# Patient Record
Sex: FEMALE | Race: BLACK OR AFRICAN AMERICAN | NOT HISPANIC OR LATINO | Employment: FULL TIME | URBAN - METROPOLITAN AREA
[De-identification: names, ages, dates, MRNs, and addresses within clinical notes are randomized per-mention and may not be internally consistent; named-entity substitution may affect disease eponyms.]

---

## 2023-06-12 ENCOUNTER — OFFICE VISIT (OUTPATIENT)
Dept: FAMILY MEDICINE | Facility: CLINIC | Age: 38
End: 2023-06-12
Payer: MEDICAID

## 2023-06-12 VITALS
BODY MASS INDEX: 39.7 KG/M2 | DIASTOLIC BLOOD PRESSURE: 80 MMHG | TEMPERATURE: 99 F | HEIGHT: 57 IN | HEART RATE: 79 BPM | OXYGEN SATURATION: 100 % | WEIGHT: 184 LBS | SYSTOLIC BLOOD PRESSURE: 117 MMHG

## 2023-06-12 DIAGNOSIS — F32.A DEPRESSION, UNSPECIFIED DEPRESSION TYPE: ICD-10-CM

## 2023-06-12 DIAGNOSIS — F41.9 ANXIETY: ICD-10-CM

## 2023-06-12 DIAGNOSIS — R07.89 RIGHT-SIDED CHEST WALL PAIN: Primary | ICD-10-CM

## 2023-06-12 DIAGNOSIS — Z00.00 ENCOUNTER FOR WELLNESS EXAMINATION: ICD-10-CM

## 2023-06-12 DIAGNOSIS — A60.00 GENITAL HERPES SIMPLEX, UNSPECIFIED SITE: ICD-10-CM

## 2023-06-12 LAB
ALBUMIN SERPL-MCNC: 3.7 G/DL (ref 3.5–5)
ALBUMIN/GLOB SERPL: 1.1 RATIO (ref 1.1–2)
ALP SERPL-CCNC: 57 UNIT/L (ref 40–150)
ALT SERPL-CCNC: 12 UNIT/L (ref 0–55)
APPEARANCE UR: CLEAR
AST SERPL-CCNC: 13 UNIT/L (ref 5–34)
BACTERIA #/AREA URNS AUTO: ABNORMAL /HPF
BASOPHILS # BLD AUTO: 0.02 X10(3)/MCL
BASOPHILS NFR BLD AUTO: 0.4 %
BILIRUB UR QL STRIP.AUTO: NEGATIVE MG/DL
BILIRUBIN DIRECT+TOT PNL SERPL-MCNC: 0.3 MG/DL
BUN SERPL-MCNC: 10.6 MG/DL (ref 7–18.7)
CALCIUM SERPL-MCNC: 8.8 MG/DL (ref 8.4–10.2)
CHLORIDE SERPL-SCNC: 105 MMOL/L (ref 98–107)
CHOLEST SERPL-MCNC: 176 MG/DL
CHOLEST/HDLC SERPL: 4 {RATIO} (ref 0–5)
CO2 SERPL-SCNC: 28 MMOL/L (ref 22–29)
COLOR UR: YELLOW
CREAT SERPL-MCNC: 0.93 MG/DL (ref 0.55–1.02)
DEPRECATED CALCIDIOL+CALCIFEROL SERPL-MC: 17.3 NG/ML (ref 30–80)
EOSINOPHIL # BLD AUTO: 0.09 X10(3)/MCL (ref 0–0.9)
EOSINOPHIL NFR BLD AUTO: 1.6 %
ERYTHROCYTE [DISTWIDTH] IN BLOOD BY AUTOMATED COUNT: 13.3 % (ref 11.5–17)
EST. AVERAGE GLUCOSE BLD GHB EST-MCNC: 102.5 MG/DL
GFR SERPLBLD CREATININE-BSD FMLA CKD-EPI: >60 MLS/MIN/1.73/M2
GLOBULIN SER-MCNC: 3.4 GM/DL (ref 2.4–3.5)
GLUCOSE SERPL-MCNC: 91 MG/DL (ref 74–100)
GLUCOSE UR QL STRIP.AUTO: NORMAL MG/DL
HAV IGM SERPL QL IA: NONREACTIVE
HBA1C MFR BLD: 5.2 %
HBV CORE IGM SERPL QL IA: NONREACTIVE
HBV SURFACE AG SERPL QL IA: NONREACTIVE
HCT VFR BLD AUTO: 42.9 % (ref 37–47)
HCV AB SERPL QL IA: NONREACTIVE
HDLC SERPL-MCNC: 41 MG/DL (ref 35–60)
HGB BLD-MCNC: 13.7 G/DL (ref 12–16)
HIV 1+2 AB+HIV1 P24 AG SERPL QL IA: NONREACTIVE
HYALINE CASTS #/AREA URNS LPF: ABNORMAL /LPF
IMM GRANULOCYTES # BLD AUTO: 0.02 X10(3)/MCL (ref 0–0.04)
IMM GRANULOCYTES NFR BLD AUTO: 0.4 %
KETONES UR QL STRIP.AUTO: NEGATIVE MG/DL
LDLC SERPL CALC-MCNC: 118 MG/DL (ref 50–140)
LEUKOCYTE ESTERASE UR QL STRIP.AUTO: NEGATIVE UNIT/L
LYMPHOCYTES # BLD AUTO: 2.98 X10(3)/MCL (ref 0.6–4.6)
LYMPHOCYTES NFR BLD AUTO: 53 %
MCH RBC QN AUTO: 27.6 PG (ref 27–31)
MCHC RBC AUTO-ENTMCNC: 31.9 G/DL (ref 33–36)
MCV RBC AUTO: 86.3 FL (ref 80–94)
MONOCYTES # BLD AUTO: 0.34 X10(3)/MCL (ref 0.1–1.3)
MONOCYTES NFR BLD AUTO: 6 %
MUCOUS THREADS URNS QL MICRO: ABNORMAL /LPF
NEUTROPHILS # BLD AUTO: 2.17 X10(3)/MCL (ref 2.1–9.2)
NEUTROPHILS NFR BLD AUTO: 38.6 %
NITRITE UR QL STRIP.AUTO: NEGATIVE
NRBC BLD AUTO-RTO: 0 %
PH UR STRIP.AUTO: 6 [PH]
PLATELET # BLD AUTO: 237 X10(3)/MCL (ref 130–400)
PMV BLD AUTO: 10.4 FL (ref 7.4–10.4)
POTASSIUM SERPL-SCNC: 4.1 MMOL/L (ref 3.5–5.1)
PROT SERPL-MCNC: 7.1 GM/DL (ref 6.4–8.3)
PROT UR QL STRIP.AUTO: ABNORMAL MG/DL
RBC # BLD AUTO: 4.97 X10(6)/MCL (ref 4.2–5.4)
RBC #/AREA URNS AUTO: ABNORMAL /HPF
RBC UR QL AUTO: NEGATIVE UNIT/L
SODIUM SERPL-SCNC: 139 MMOL/L (ref 136–145)
SP GR UR STRIP.AUTO: 1.03
SQUAMOUS #/AREA URNS LPF: ABNORMAL /HPF
T PALLIDUM AB SER QL: NONREACTIVE
T4 FREE SERPL-MCNC: 0.85 NG/DL (ref 0.7–1.48)
TRIGL SERPL-MCNC: 87 MG/DL (ref 37–140)
TSH SERPL-ACNC: 0.77 UIU/ML (ref 0.35–4.94)
UROBILINOGEN UR STRIP-ACNC: NORMAL MG/DL
VIT B12 SERPL-MCNC: 454 PG/ML (ref 213–816)
VLDLC SERPL CALC-MCNC: 17 MG/DL
WBC # SPEC AUTO: 5.62 X10(3)/MCL (ref 4.5–11.5)
WBC #/AREA URNS AUTO: ABNORMAL /HPF

## 2023-06-12 PROCEDURE — 83036 HEMOGLOBIN GLYCOSYLATED A1C: CPT | Performed by: NURSE PRACTITIONER

## 2023-06-12 PROCEDURE — 99395 PREV VISIT EST AGE 18-39: CPT | Mod: S$PBB,,, | Performed by: NURSE PRACTITIONER

## 2023-06-12 PROCEDURE — 1160F RVW MEDS BY RX/DR IN RCRD: CPT | Mod: CPTII,,, | Performed by: NURSE PRACTITIONER

## 2023-06-12 PROCEDURE — 87389 HIV-1 AG W/HIV-1&-2 AB AG IA: CPT | Performed by: NURSE PRACTITIONER

## 2023-06-12 PROCEDURE — 36415 COLL VENOUS BLD VENIPUNCTURE: CPT | Performed by: NURSE PRACTITIONER

## 2023-06-12 PROCEDURE — 3074F SYST BP LT 130 MM HG: CPT | Mod: CPTII,,, | Performed by: NURSE PRACTITIONER

## 2023-06-12 PROCEDURE — 80061 LIPID PANEL: CPT | Performed by: NURSE PRACTITIONER

## 2023-06-12 PROCEDURE — 1160F PR REVIEW ALL MEDS BY PRESCRIBER/CLIN PHARMACIST DOCUMENTED: ICD-10-PCS | Mod: CPTII,,, | Performed by: NURSE PRACTITIONER

## 2023-06-12 PROCEDURE — 3079F DIAST BP 80-89 MM HG: CPT | Mod: CPTII,,, | Performed by: NURSE PRACTITIONER

## 2023-06-12 PROCEDURE — 1159F MED LIST DOCD IN RCRD: CPT | Mod: CPTII,,, | Performed by: NURSE PRACTITIONER

## 2023-06-12 PROCEDURE — 84443 ASSAY THYROID STIM HORMONE: CPT | Performed by: NURSE PRACTITIONER

## 2023-06-12 PROCEDURE — 3079F PR MOST RECENT DIASTOLIC BLOOD PRESSURE 80-89 MM HG: ICD-10-PCS | Mod: CPTII,,, | Performed by: NURSE PRACTITIONER

## 2023-06-12 PROCEDURE — 81001 URINALYSIS AUTO W/SCOPE: CPT | Performed by: NURSE PRACTITIONER

## 2023-06-12 PROCEDURE — 82607 VITAMIN B-12: CPT | Performed by: NURSE PRACTITIONER

## 2023-06-12 PROCEDURE — 3008F BODY MASS INDEX DOCD: CPT | Mod: CPTII,,, | Performed by: NURSE PRACTITIONER

## 2023-06-12 PROCEDURE — 99203 OFFICE O/P NEW LOW 30 MIN: CPT | Mod: PBBFAC,PN | Performed by: NURSE PRACTITIONER

## 2023-06-12 PROCEDURE — 1159F PR MEDICATION LIST DOCUMENTED IN MEDICAL RECORD: ICD-10-PCS | Mod: CPTII,,, | Performed by: NURSE PRACTITIONER

## 2023-06-12 PROCEDURE — 85025 COMPLETE CBC W/AUTO DIFF WBC: CPT | Performed by: NURSE PRACTITIONER

## 2023-06-12 PROCEDURE — 84439 ASSAY OF FREE THYROXINE: CPT | Performed by: NURSE PRACTITIONER

## 2023-06-12 PROCEDURE — 82306 VITAMIN D 25 HYDROXY: CPT | Performed by: NURSE PRACTITIONER

## 2023-06-12 PROCEDURE — 99395 PR PREVENTIVE VISIT,EST,18-39: ICD-10-PCS | Mod: S$PBB,,, | Performed by: NURSE PRACTITIONER

## 2023-06-12 PROCEDURE — 86780 TREPONEMA PALLIDUM: CPT | Performed by: NURSE PRACTITIONER

## 2023-06-12 PROCEDURE — 3008F PR BODY MASS INDEX (BMI) DOCUMENTED: ICD-10-PCS | Mod: CPTII,,, | Performed by: NURSE PRACTITIONER

## 2023-06-12 PROCEDURE — 3074F PR MOST RECENT SYSTOLIC BLOOD PRESSURE < 130 MM HG: ICD-10-PCS | Mod: CPTII,,, | Performed by: NURSE PRACTITIONER

## 2023-06-12 PROCEDURE — 80074 ACUTE HEPATITIS PANEL: CPT | Performed by: NURSE PRACTITIONER

## 2023-06-12 PROCEDURE — 80053 COMPREHEN METABOLIC PANEL: CPT | Performed by: NURSE PRACTITIONER

## 2023-06-12 RX ORDER — FLUOXETINE HYDROCHLORIDE 40 MG/1
40 CAPSULE ORAL
COMMUNITY
Start: 2023-05-17

## 2023-06-12 RX ORDER — ALPRAZOLAM 1 MG/1
1 TABLET ORAL DAILY PRN
COMMUNITY
Start: 2023-05-17

## 2023-06-12 RX ORDER — ERGOCALCIFEROL 1.25 MG/1
50000 CAPSULE ORAL
COMMUNITY
Start: 2023-04-19 | End: 2023-06-13

## 2023-06-12 RX ORDER — VALACYCLOVIR HYDROCHLORIDE 1 G/1
1000 TABLET, FILM COATED ORAL 2 TIMES DAILY
Qty: 20 TABLET | Refills: 11 | Status: SHIPPED | OUTPATIENT
Start: 2023-06-12

## 2023-06-12 NOTE — PROGRESS NOTES
"Patient Name: Fátima Brown   : 1985  MRN: [id number]     SUBJECTIVE DATA:    CHIEF COMPLAINT:  Fátima Brown  is a 37 y.o. female presenting to clinic today for Establish Care      HPI:  23:  Establish care with PCP.  Right chest wall pain intermittent and sporadic.  None today.  Not sure how long it lasts.  No n/v/diaphoresis, radiation.       ROS:  Review of Systems   Constitutional: Negative.    HENT: Negative.     Eyes: Negative.    Respiratory: Negative.     Cardiovascular:  Positive for chest pain.   Gastrointestinal: Negative.    Genitourinary: Negative.    Musculoskeletal: Negative.    Skin: Negative.    Neurological: Negative.    Endo/Heme/Allergies: Negative.    Psychiatric/Behavioral: Negative.     All other systems reviewed and are negative.    ALLERGIES:  Review of patient's allergies indicates:   Allergen Reactions    Ibuprofen Itching        HISTORY:  History reviewed. No pertinent past medical history.   History reviewed. No pertinent surgical history.  Family History   Problem Relation Age of Onset    Hypertension Mother     Hypertension Father     Diabetes Father      Social History     Tobacco Use   Smoking Status Every Day    Packs/day: 0.50    Types: Cigarettes   Smokeless Tobacco Never        OBJECTIVE DATA:  Vital signs  Vitals:    23 0840   BP: 117/80   BP Location: Right leg   Patient Position: Sitting   Pulse: 79   Temp: 98.8 °F (37.1 °C)   TempSrc: Oral   SpO2: 100%   Weight: 83.5 kg (184 lb)   Height: 4' 9" (1.448 m)        Physical Exam  Constitutional:       General: She is awake.      Appearance: Normal appearance. She is well-developed.   HENT:      Head: Normocephalic and atraumatic.      Right Ear: Hearing, tympanic membrane, ear canal and external ear normal.      Left Ear: Hearing, tympanic membrane, ear canal and external ear normal.      Nose: Nose normal.      Mouth/Throat:      Lips: Pink.      Mouth: Mucous membranes are moist.      Pharynx: Oropharynx " is clear. Uvula midline.   Eyes:      Pupils: Pupils are equal, round, and reactive to light.   Cardiovascular:      Rate and Rhythm: Normal rate and regular rhythm.      Pulses: Normal pulses.      Heart sounds: Normal heart sounds.   Pulmonary:      Effort: Pulmonary effort is normal.      Breath sounds: Normal breath sounds.   Abdominal:      General: Abdomen is flat. Bowel sounds are normal.      Palpations: Abdomen is soft.   Musculoskeletal:         General: Normal range of motion.      Cervical back: Normal range of motion and neck supple.   Skin:     General: Skin is warm and dry.      Capillary Refill: Capillary refill takes less than 2 seconds.   Neurological:      General: No focal deficit present.      Mental Status: She is alert, oriented to person, place, and time and easily aroused. Mental status is at baseline.   Psychiatric:         Mood and Affect: Mood normal.         Behavior: Behavior is cooperative.        ASSESSMENT/PLAN:  1. Right-sided chest wall pain  Assessment & Plan:  EKG in clinic today  ER precautions given.    Orders:  -     IN OFFICE EKG 12-LEAD (to [facility])    2. Anxiety  Assessment & Plan:  Continue Prozac, Xanax, Continue appt at [facility]. Last appt was on 5/30/23.   Practice deep breathing or abdominal breathing exercises when anxiety occurs.  Exercise daily. Get sunlight daily.  Avoid caffeine, alcohol and stimulants.  Practice positive phrases and repeat throughout the day, yoga, lavender scents or Chamomile tea will help anxiety.  Set healthy boundaries, avoid people and conversations that increase stress.  Reports any symptoms of suicidal or homicidal ideations immediately, if clinic is closed go to nearest emergency room.          3. Depression, unspecified depression type  Assessment & Plan:  Continue Prozac    Read positive daily meditations, avoid negative media, set healthy boundaries.  Exercise daily, keep consistent sleep pattern, eat a healthy diet.  Establish good social  support, make changes to reduce stress.  Reports any symptoms of suicidal/homicidal ideations or self harm immediately, if clinic is closed go to nearest emergency room.        4. Genital herpes simplex, unspecified site  Assessment & Plan:  Valtrex 1000mg po bid x 10 days during flare    Orders:  -     valACYclovir (VALTREX) 1000 MG tablet; Take 1 tablet (1,000 mg total) by mouth 2 (two) times daily.  Dispense: 20 tablet; Refill: 11    5. Encounter for wellness examination  -     Lipid Panel  -     CBC Auto Differential  -     Comprehensive Metabolic Panel  -     Urinalysis  -     Hemoglobin A1C  -     TSH  -     T4, Free  -     Vitamin D  -     Vitamin B12  -     Hepatitis Panel, Acute  -     HIV 1/2 Ag/Ab (4th Gen)  -     SYPHILIS ANTIBODY (WITH REFLEX RPR)          No results found for this or any previous visit (from the past 1008 hour(s)).        Follow Up:  Follow up in about 2 weeks (around 6/26/2023) for Review of labs.             This note was created with the assistance of a voice recognition software or phone dictation. There may be transcription errors as a result of using this technology however minimal. Effort has been made to assure accuracy of transcription but any obvious errors or omissions should be clarified with the author of the document

## 2023-06-12 NOTE — ASSESSMENT & PLAN NOTE
Continue Prozac, Xanax, Continue appt at [facility]. Last appt was on 5/30/23.   Practice deep breathing or abdominal breathing exercises when anxiety occurs.  Exercise daily. Get sunlight daily.  Avoid caffeine, alcohol and stimulants.  Practice positive phrases and repeat throughout the day, yoga, lavender scents or Chamomile tea will help anxiety.  Set healthy boundaries, avoid people and conversations that increase stress.  Reports any symptoms of suicidal or homicidal ideations immediately, if clinic is closed go to nearest emergency room.

## 2023-06-12 NOTE — ASSESSMENT & PLAN NOTE
Continue Prozac    Read positive daily meditations, avoid negative media, set healthy boundaries.  Exercise daily, keep consistent sleep pattern, eat a healthy diet.  Establish good social support, make changes to reduce stress.  Reports any symptoms of suicidal/homicidal ideations or self harm immediately, if clinic is closed go to nearest emergency room.

## 2023-06-13 DIAGNOSIS — E55.9 VITAMIN D DEFICIENCY: Primary | ICD-10-CM

## 2023-06-13 RX ORDER — ASPIRIN 325 MG
50000 TABLET, DELAYED RELEASE (ENTERIC COATED) ORAL
Qty: 12 CAPSULE | Refills: 0 | Status: SHIPPED | OUTPATIENT
Start: 2023-06-13 | End: 2024-01-19 | Stop reason: SDUPTHER

## 2023-06-15 LAB — PATH REV: NORMAL

## 2023-07-10 ENCOUNTER — HOSPITAL ENCOUNTER (EMERGENCY)
Facility: HOSPITAL | Age: 38
Discharge: HOME | End: 2023-07-10
Attending: EMERGENCY MEDICINE
Payer: MEDICAID

## 2023-07-10 VITALS
WEIGHT: 200 LBS | RESPIRATION RATE: 20 BRPM | HEART RATE: 78 BPM | DIASTOLIC BLOOD PRESSURE: 96 MMHG | SYSTOLIC BLOOD PRESSURE: 141 MMHG | TEMPERATURE: 99 F | BODY MASS INDEX: 43.28 KG/M2

## 2023-07-10 DIAGNOSIS — K02.9 PAIN DUE TO DENTAL CARIES: Primary | ICD-10-CM

## 2023-07-10 PROCEDURE — 99284 EMERGENCY DEPT VISIT MOD MDM: CPT

## 2023-07-10 RX ORDER — HYDROCODONE BITARTRATE AND ACETAMINOPHEN 5; 325 MG/1; MG/1
1 TABLET ORAL EVERY 6 HOURS PRN
Qty: 20 TABLET | Refills: 0 | Status: SHIPPED | OUTPATIENT
Start: 2023-07-10 | End: 2023-07-10 | Stop reason: SDUPTHER

## 2023-07-10 RX ORDER — HYDROCODONE BITARTRATE AND ACETAMINOPHEN 5; 325 MG/1; MG/1
1 TABLET ORAL EVERY 6 HOURS PRN
Qty: 20 TABLET | Refills: 0 | Status: SHIPPED | OUTPATIENT
Start: 2023-07-10 | End: 2023-07-15

## 2023-07-10 RX ORDER — CHLORHEXIDINE GLUCONATE ORAL RINSE 1.2 MG/ML
15 SOLUTION DENTAL 2 TIMES DAILY
Qty: 420 ML | Refills: 0 | Status: SHIPPED | OUTPATIENT
Start: 2023-07-10 | End: 2023-07-24

## 2023-07-10 RX ORDER — AMOXICILLIN AND CLAVULANATE POTASSIUM 875; 125 MG/1; MG/1
1 TABLET, FILM COATED ORAL 2 TIMES DAILY
Qty: 14 TABLET | Refills: 0 | Status: SHIPPED | OUTPATIENT
Start: 2023-07-10 | End: 2023-08-02

## 2023-07-10 NOTE — ED PROVIDER NOTES
Encounter Date: 7/10/2023       History     Chief Complaint   Patient presents with    Dental Pain     Dental pain x 2 days, left upper      37-year-old female presents to ED for evaluation of left upper dental pain and swelling.  Patient reports having a cracked tooth.  States she is been unable to get in to see a dentist.  Denies any trouble swallowing or shortness of breath.  Eating and drinking normally.    The history is provided by the patient. No  was used.   Review of patient's allergies indicates:   Allergen Reactions    Ibuprofen Itching     No past medical history on file.  No past surgical history on file.  Family History   Problem Relation Age of Onset    Hypertension Mother     Hypertension Father     Diabetes Father      Social History     Tobacco Use    Smoking status: Every Day     Packs/day: 0.50     Types: Cigarettes    Smokeless tobacco: Never   Substance Use Topics    Alcohol use: Yes     Comment: occasionaly    Drug use: Never     Review of Systems   Constitutional:  Negative for chills and fever.   HENT:  Positive for dental problem. Negative for congestion, ear pain, facial swelling, sore throat, trouble swallowing and voice change.    Respiratory:  Negative for cough and shortness of breath.    Cardiovascular: Negative.    Gastrointestinal: Negative.    Musculoskeletal: Negative.    Neurological:  Negative for dizziness and headaches.   All other systems reviewed and are negative.    Physical Exam     Initial Vitals   BP Pulse Resp Temp SpO2   07/10/23 1248 07/10/23 1246 07/10/23 1246 07/10/23 1246 --   (!) 141/96 78 20 98.5 °F (36.9 °C)       MAP       --                Physical Exam    Vitals reviewed.  Constitutional: She appears well-developed.   HENT:   Head: Normocephalic and atraumatic.   Right Ear: Tympanic membrane and external ear normal.   Left Ear: Tympanic membrane and external ear normal.   Mouth/Throat: Uvula is midline, oropharynx is clear and moist and  mucous membranes are normal. No trismus in the jaw. Dental caries present. No uvula swelling. No oropharyngeal exudate, posterior oropharyngeal edema or posterior oropharyngeal erythema.       Multiple dental caries varying ages of decay.   Eyes: Conjunctivae are normal. Pupils are equal, round, and reactive to light.   Neck: Neck supple.   Normal range of motion.  Cardiovascular:  Normal rate, regular rhythm and normal heart sounds.           Pulmonary/Chest: Breath sounds normal. She has no wheezes. She has no rhonchi. She has no rales.   Abdominal: Abdomen is soft. Bowel sounds are normal. There is no abdominal tenderness.   Musculoskeletal:         General: Normal range of motion.      Cervical back: Normal range of motion and neck supple.     Neurological: She is alert and oriented to person, place, and time. She has normal strength. No cranial nerve deficit or sensory deficit. GCS score is 15. GCS eye subscore is 4. GCS verbal subscore is 5. GCS motor subscore is 6.   Skin: Skin is warm and dry.   Psychiatric: She has a normal mood and affect.       ED Course   Procedures  Labs Reviewed - No data to display       Imaging Results    None          Medications - No data to display  Medical Decision Making:   Initial Assessment:   37-year-old female presents to ED for evaluation of left upper dental pain and swelling.  Patient reports having a cracked tooth.  States she is been unable to get in to see a dentist.  Denies any trouble swallowing or shortness of breath.  Eating and drinking normally.  Differential Diagnosis:   Dental caries, dental abscess  ED Management:  Patient presents to ED for evaluation of dental pain due to caries and cracked tooth.  Swelling noted to gums.  No trismus noted. No fluctuant abscess.  Will place on antibiotics and give short course of pain medication.  Discussed with patient need for follow-up with dentist.  Return ED precautions given.  Patient verbalizes understanding and  agrees with plan of care.                        Clinical Impression:   Final diagnoses:  [K02.9] Pain due to dental caries (Primary)        ED Disposition Condition    Discharge Stable          ED Prescriptions       Medication Sig Dispense Start Date End Date Auth. Provider    chlorhexidine (PERIDEX) 0.12 % solution Use as directed 15 mLs in the mouth or throat 2 (two) times daily. for 14 days 420 mL 7/10/2023 7/24/2023 MARILIA Torres    HYDROcodone-acetaminophen (NORCO) 5-325 mg per tablet  (Status: Discontinued) Take 1 tablet by mouth every 6 (six) hours as needed for Pain. 20 tablet 7/10/2023 7/10/2023 MARILIA Torres    amoxicillin-clavulanate 875-125mg (AUGMENTIN) 875-125 mg per tablet Take 1 tablet by mouth 2 (two) times daily. 14 tablet 7/10/2023 -- MARILIA Torres    HYDROcodone-acetaminophen (NORCO) 5-325 mg per tablet Take 1 tablet by mouth every 6 (six) hours as needed for Pain. 20 tablet 7/10/2023 7/15/2023 MARILIA Torres          Follow-up Information       Follow up With Specialties Details Why Contact Info    RAMEZ Farah Nurse Practitioner   [facility] [address] [phone]      Dentist   7-10 days, As needed              MARILIA Torres  07/10/23 2399

## 2023-07-10 NOTE — DISCHARGE INSTRUCTIONS
Take full course of antibiotics.  Swish and spit peridex. May use Norco for severe pain.  Do not drink or drive while taking narcotics it can be sedating.    [facility] [address] [place] [address] [phone] Medicaid/Medicare   Dr. Peter Stauffer, DDS  [address] [place] [address] [phone] Medicaid   [facility] [address] [place] [address] [phone] Medicaid   [facility] [address] [place] [address] [phone] Medicare [facility]  [address] [place] [address] [phone] Medicaid/Medicare   [facility] [address] [place] [address] [phone] Medicaid   [facility]  [address]  [place] [address] [phone] Medicaid for ages 5 and under; or for lip/tongue tie    Dr. Navneet Chaves, DDS [address] [place] [address] [phone] Medicaid-only for children ages 2 to 21   [facility] [address]  [place] [address] [phone] Medicaid   [facility] [address] [place] [address] [phone] Medicare [facility] [address] [place] [address] [phone] Medicaid   [facility] [address] [place] [address] [phone] Medicaid-Pediatric dentistry    [facility] [address] [place] [address] [phone] Medicaid/Medicare   [facility] [address] [place] [address] [phone] Medicaid/Medicare   [facility] [address]  [place] [address] [phone] Medicaid/Medicare   [facility] [address] [place] [address] [phone] Medicaid/Medicare   [place] [address] [address] [phone] Medicaid/Medicare   [facility] [address] [place] [address] [phone] Medicaid-Pediatric and adult   [facility] [address]  [place] [address] [phone] Medicaid   [facility] [address]  [place], [place] [id number] [phone] Medicare   Dr. Farrah Avilez,  [place] [address] [phone] Medicare for dentures only

## 2023-08-02 ENCOUNTER — OFFICE VISIT (OUTPATIENT)
Dept: FAMILY MEDICINE | Facility: CLINIC | Age: 38
End: 2023-08-02
Payer: MEDICAID

## 2023-08-02 VITALS
SYSTOLIC BLOOD PRESSURE: 138 MMHG | WEIGHT: 186.38 LBS | OXYGEN SATURATION: 99 % | DIASTOLIC BLOOD PRESSURE: 90 MMHG | BODY MASS INDEX: 40.21 KG/M2 | HEART RATE: 72 BPM | HEIGHT: 57 IN | TEMPERATURE: 98 F

## 2023-08-02 DIAGNOSIS — A60.00 GENITAL HERPES SIMPLEX, UNSPECIFIED SITE: Primary | ICD-10-CM

## 2023-08-02 DIAGNOSIS — R03.0 ELEVATED BLOOD PRESSURE READING: ICD-10-CM

## 2023-08-02 PROBLEM — R07.89 RIGHT-SIDED CHEST WALL PAIN: Status: RESOLVED | Noted: 2023-06-12 | Resolved: 2023-08-02

## 2023-08-02 PROCEDURE — 3075F SYST BP GE 130 - 139MM HG: CPT | Mod: CPTII,,, | Performed by: NURSE PRACTITIONER

## 2023-08-02 PROCEDURE — 99214 OFFICE O/P EST MOD 30 MIN: CPT | Mod: PBBFAC,PN | Performed by: NURSE PRACTITIONER

## 2023-08-02 PROCEDURE — 3075F PR MOST RECENT SYSTOLIC BLOOD PRESS GE 130-139MM HG: ICD-10-PCS | Mod: CPTII,,, | Performed by: NURSE PRACTITIONER

## 2023-08-02 PROCEDURE — 3044F PR MOST RECENT HEMOGLOBIN A1C LEVEL <7.0%: ICD-10-PCS | Mod: CPTII,,, | Performed by: NURSE PRACTITIONER

## 2023-08-02 PROCEDURE — 3080F PR MOST RECENT DIASTOLIC BLOOD PRESSURE >= 90 MM HG: ICD-10-PCS | Mod: CPTII,,, | Performed by: NURSE PRACTITIONER

## 2023-08-02 PROCEDURE — 1159F PR MEDICATION LIST DOCUMENTED IN MEDICAL RECORD: ICD-10-PCS | Mod: CPTII,,, | Performed by: NURSE PRACTITIONER

## 2023-08-02 PROCEDURE — 99213 PR OFFICE/OUTPT VISIT, EST, LEVL III, 20-29 MIN: ICD-10-PCS | Mod: S$PBB,,, | Performed by: NURSE PRACTITIONER

## 2023-08-02 PROCEDURE — 3080F DIAST BP >= 90 MM HG: CPT | Mod: CPTII,,, | Performed by: NURSE PRACTITIONER

## 2023-08-02 PROCEDURE — 1159F MED LIST DOCD IN RCRD: CPT | Mod: CPTII,,, | Performed by: NURSE PRACTITIONER

## 2023-08-02 PROCEDURE — 3008F PR BODY MASS INDEX (BMI) DOCUMENTED: ICD-10-PCS | Mod: CPTII,,, | Performed by: NURSE PRACTITIONER

## 2023-08-02 PROCEDURE — 1160F RVW MEDS BY RX/DR IN RCRD: CPT | Mod: CPTII,,, | Performed by: NURSE PRACTITIONER

## 2023-08-02 PROCEDURE — 99213 OFFICE O/P EST LOW 20 MIN: CPT | Mod: S$PBB,,, | Performed by: NURSE PRACTITIONER

## 2023-08-02 PROCEDURE — 3044F HG A1C LEVEL LT 7.0%: CPT | Mod: CPTII,,, | Performed by: NURSE PRACTITIONER

## 2023-08-02 PROCEDURE — 3008F BODY MASS INDEX DOCD: CPT | Mod: CPTII,,, | Performed by: NURSE PRACTITIONER

## 2023-08-02 PROCEDURE — 1160F PR REVIEW ALL MEDS BY PRESCRIBER/CLIN PHARMACIST DOCUMENTED: ICD-10-PCS | Mod: CPTII,,, | Performed by: NURSE PRACTITIONER

## 2023-08-02 RX ORDER — CLINDAMYCIN HYDROCHLORIDE 150 MG/1
CAPSULE ORAL
COMMUNITY
Start: 2023-07-11

## 2023-08-02 NOTE — ASSESSMENT & PLAN NOTE
Pt has to go get a COVID vaccine this afternoon and is nervous. She will return in 4 months and we will look at a log and see if she needs meds.   Low Sodium Diet (Dash Diet - less than 2 grams of sodium per day).  Monitor Blood Pressure daily and log. Report any consistent numbers greater than 140/90.  Smoking Cessation encouraged to aid in BP reduction.  Maintain healthy weight with goal BMI <30. Exercise 30 minutes per day 5 days per week

## 2023-08-02 NOTE — PROGRESS NOTES
Patient Name: Fátima Brown     : 1985    MRN: [id number]     Subjective:     Patient ID: Fátima Brown is a 37 y.o. female.    Chief Complaint:   Chief Complaint   Patient presents with    Follow-up     Pt  complaining of  appetite         HPI: 23:  FU for review of labs, missed a few appoitnments.  States that she has noticed increased appetite since starting her Vitamin D.  She started it in July and noticed she was hungrier than usual.  She has questions about Genital Herpes diagnosis today.      23:  Establish care with PCP.  Right chest wall pain intermittent and sporadic.  None today.  Not sure how long it lasts.  No n/v/diaphoresis, radiation.           ROS:      Review of Systems   Constitutional: Negative.    HENT: Negative.     Eyes: Negative.    Respiratory: Negative.     Cardiovascular: Negative.    Gastrointestinal: Negative.    Genitourinary: Negative.    Musculoskeletal: Negative.    Skin: Negative.    Neurological: Negative.    Endo/Heme/Allergies: Negative.    Psychiatric/Behavioral: Negative.     All other systems reviewed and are negative.       History:     Past Medical History:   Diagnosis Date    Anxiety     Right-sided chest wall pain 2023        History reviewed. No pertinent surgical history.    Family History   Problem Relation Age of Onset    Hypertension Mother     Hypertension Father     Diabetes Father         Social History     Tobacco Use    Smoking status: Every Day     Current packs/day: 0.50     Types: Cigarettes    Smokeless tobacco: Never   Substance and Sexual Activity    Alcohol use: Yes     Comment: occasionaly    Drug use: Never    Sexual activity: Yes     Partners: Male       Current Outpatient Medications   Medication Instructions    ALPRAZolam (XANAX) 1 mg, Oral, Daily PRN    cholecalciferol (vitamin D3) 50,000 Units, Oral, Every 7 days    clindamycin (CLEOCIN) 150 MG capsule SMARTSI Capsule(s) By Mouth Every 5 Hours     "FLUoxetine 40 mg, Oral    valACYclovir (VALTREX) 1,000 mg, Oral, 2 times daily        Review of patient's allergies indicates:   Allergen Reactions    Ibuprofen Itching       Objective:     Visit Vitals  BP (!) 138/90 (BP Location: Left arm, Patient Position: Sitting, BP Method: Large (Manual))   Pulse 72   Temp 98 °F (36.7 °C) (Oral)   Ht 4' 9" (1.448 m)   Wt 84.6 kg (186 lb 6.4 oz)   SpO2 99%   BMI 40.34 kg/m²       Physical Examination:     Physical Exam  Vitals and nursing note reviewed.   HENT:      Head: Normocephalic and atraumatic.   Cardiovascular:      Rate and Rhythm: Normal rate and regular rhythm.      Pulses: Normal pulses.      Heart sounds: Normal heart sounds.   Pulmonary:      Breath sounds: Normal breath sounds.   Musculoskeletal:         General: Normal range of motion.      Cervical back: Normal range of motion.   Skin:     General: Skin is warm and dry.   Neurological:      General: No focal deficit present.      Mental Status: She is alert and oriented to person, place, and time.   Psychiatric:         Mood and Affect: Mood normal.         Lab Results:     Chemistry:  Lab Results   Component Value Date     06/12/2023    K 4.1 06/12/2023    CHLORIDE 105 06/12/2023    BUN 10.6 06/12/2023    CREATININE 0.93 06/12/2023    EGFRNORACEVR >60 06/12/2023    GLUCOSE 91 06/12/2023    CALCIUM 8.8 06/12/2023    ALKPHOS 57 06/12/2023    LABPROT 7.1 06/12/2023    ALBUMIN 3.7 06/12/2023    AST 13 06/12/2023    ALT 12 06/12/2023    EFCJZKBM32XF 17.3 (L) 06/12/2023    TSH 0.767 06/12/2023    TGGSJK6VBJD 0.85 06/12/2023        Lab Results   Component Value Date    HGBA1C 5.2 06/12/2023        Hematology:  Lab Results   Component Value Date    WBC 5.62 06/12/2023    HGB 13.7 06/12/2023    HCT 42.9 06/12/2023     06/12/2023       Lipid Panel:  Lab Results   Component Value Date    CHOL 176 06/12/2023    HDL 41 06/12/2023    .00 06/12/2023    TRIG 87 06/12/2023    TOTALCHOLEST 4 06/12/2023    "     Urine:  Lab Results   Component Value Date    COLORUA Yellow 06/12/2023    APPEARANCEUA Clear 06/12/2023    SGUA 1.028 06/12/2023    PHUA 6.0 06/12/2023    PROTEINUA Trace (A) 06/12/2023    GLUCOSEUA Normal 06/12/2023    KETONESUA Negative 06/12/2023    BLOODUA Negative 06/12/2023    NITRITESUA Negative 06/12/2023    LEUKOCYTESUR Negative 06/12/2023    RBCUA 0-5 06/12/2023    WBCUA 0-5 06/12/2023    BACTERIA Occ (A) 06/12/2023    SQEPUA Moderate (A) 06/12/2023    HYALINECASTS 0-2 (A) 06/12/2023        Assessment:          ICD-10-CM ICD-9-CM   1. Genital herpes simplex, unspecified site  A60.00 054.10   2. Elevated blood pressure reading  R03.0 796.2        Plan:     1. Genital herpes simplex, unspecified site  Assessment & Plan:  We had a long discussion regarding herpes and diagnosing herpes.      Continue Valtrex.            2. Elevated blood pressure reading  Assessment & Plan:  Pt has to go get a COVID vaccine this afternoon and is nervous. She will return in 4 months and we will look at a log and see if she needs meds.   Low Sodium Diet (Dash Diet - less than 2 grams of sodium per day).  Monitor Blood Pressure daily and log. Report any consistent numbers greater than 140/90.  Smoking Cessation encouraged to aid in BP reduction.  Maintain healthy weight with goal BMI <30. Exercise 30 minutes per day 5 days per week             Follow up in about 4 months (around 12/2/2023) for elevated blood pressure reading in clinic, genital herpes.    Future Appointments   Date Time Provider Department Center   12/4/2023 12:00 PM RAMEZ Farah [facility]          RAMEZ Farah

## 2023-08-18 ENCOUNTER — TELEPHONE (OUTPATIENT)
Dept: FAMILY MEDICINE | Facility: CLINIC | Age: 38
End: 2023-08-18
Payer: MEDICAID

## 2023-08-18 NOTE — TELEPHONE ENCOUNTER
Called and talked to pt and she was asking for a record of a recent chest xray. We had no recent xray on file. She needed this for a TB test that came back suspicious. She will be going to  to get xray

## 2023-08-22 ENCOUNTER — OFFICE VISIT (OUTPATIENT)
Dept: URGENT CARE | Facility: CLINIC | Age: 38
End: 2023-08-22
Payer: MEDICAID

## 2023-08-22 ENCOUNTER — HOSPITAL ENCOUNTER (OUTPATIENT)
Dept: RADIOLOGY | Facility: HOSPITAL | Age: 38
Discharge: HOME | End: 2023-08-22
Payer: MEDICAID

## 2023-08-22 VITALS
DIASTOLIC BLOOD PRESSURE: 90 MMHG | HEART RATE: 75 BPM | WEIGHT: 181.13 LBS | BODY MASS INDEX: 39.08 KG/M2 | RESPIRATION RATE: 18 BRPM | OXYGEN SATURATION: 100 % | TEMPERATURE: 98 F | SYSTOLIC BLOOD PRESSURE: 125 MMHG | HEIGHT: 57 IN

## 2023-08-22 DIAGNOSIS — R76.11 ABNORMAL REACTION TO TUBERCULIN TEST: Primary | ICD-10-CM

## 2023-08-22 DIAGNOSIS — Z32.02 NEGATIVE PREGNANCY TEST: ICD-10-CM

## 2023-08-22 LAB
B-HCG UR QL: NEGATIVE
CTP QC/QA: YES

## 2023-08-22 PROCEDURE — 99214 OFFICE O/P EST MOD 30 MIN: CPT | Mod: PBBFAC,25

## 2023-08-22 PROCEDURE — 99203 OFFICE O/P NEW LOW 30 MIN: CPT | Mod: S$PBB,,,

## 2023-08-22 PROCEDURE — 81025 URINE PREGNANCY TEST: CPT | Mod: PBBFAC

## 2023-08-22 PROCEDURE — 99203 PR OFFICE/OUTPT VISIT, NEW, LEVL III, 30-44 MIN: ICD-10-PCS | Mod: S$PBB,,,

## 2023-08-22 PROCEDURE — 71046 X-RAY EXAM CHEST 2 VIEWS: CPT | Mod: TC

## 2023-08-22 NOTE — PROGRESS NOTES
"Subjective:      Patient ID: Fátima Brown is a 37 y.o. female.    Vitals:  height is 4' 9" (1.448 m) and weight is 82.1 kg (181 lb 1.6 oz). Her temperature is 98.2 °F (36.8 °C). Her blood pressure is 125/90 (abnormal) and her pulse is 75. Her respiration is 18 and oxygen saturation is 100%.     Chief Complaint: Requesting CXR for abnormal PPD reading.    Cc as above. No history of positive TB test in the past. Asymptomatic. 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       ROS   Objective:     Physical Exam   Constitutional: She is oriented to person, place, and time.   HENT:   Head: Normocephalic and atraumatic.   Neck: Neck supple.   Pulmonary/Chest: Effort normal. No respiratory distress.   Abdominal: Normal appearance.   Musculoskeletal: Normal range of motion.         General: Normal range of motion.   Neurological: no focal deficit. She is alert and oriented to person, place, and time.   Skin: Skin is warm and dry.   Psychiatric: Her behavior is normal. Mood normal.       Assessment:     1. Abnormal reaction to tuberculin test    2. Negative pregnancy test      Results for orders placed or performed in visit on 08/22/23   POCT urine pregnancy   Result Value Ref Range    POC Preg Test, Ur Negative Negative     Acceptable Yes        Plan:       Abnormal reaction to tuberculin test  -     XR CHEST PA AND LATERAL; Future; Expected date: 08/22/2023  -     POCT urine pregnancy    Negative pregnancy test        Follow up with PCP. If radiologist interpretation differs the clinic will notify you.             "

## 2023-10-16 ENCOUNTER — PATIENT MESSAGE (OUTPATIENT)
Dept: ADMINISTRATIVE | Facility: HOSPITAL | Age: 38
End: 2023-10-16
Payer: MEDICAID

## 2023-11-17 ENCOUNTER — TELEPHONE (OUTPATIENT)
Dept: FAMILY MEDICINE | Facility: CLINIC | Age: 38
End: 2023-11-17
Payer: MEDICAID

## 2024-01-12 ENCOUNTER — OFFICE VISIT (OUTPATIENT)
Dept: FAMILY MEDICINE | Facility: CLINIC | Age: 39
End: 2024-01-12
Payer: MEDICAID

## 2024-01-12 VITALS
HEIGHT: 57 IN | HEART RATE: 88 BPM | OXYGEN SATURATION: 99 % | DIASTOLIC BLOOD PRESSURE: 80 MMHG | SYSTOLIC BLOOD PRESSURE: 123 MMHG | TEMPERATURE: 98 F | WEIGHT: 176 LBS | BODY MASS INDEX: 37.97 KG/M2 | RESPIRATION RATE: 18 BRPM

## 2024-01-12 DIAGNOSIS — N92.0 MENORRHAGIA WITH REGULAR CYCLE: Primary | ICD-10-CM

## 2024-01-12 DIAGNOSIS — A60.00 GENITAL HERPES SIMPLEX, UNSPECIFIED SITE: ICD-10-CM

## 2024-01-12 DIAGNOSIS — L25.3 CONTACT DERMATITIS DUE TO CHEMICALS: ICD-10-CM

## 2024-01-12 DIAGNOSIS — F41.9 ANXIETY: ICD-10-CM

## 2024-01-12 DIAGNOSIS — F32.A DEPRESSION, UNSPECIFIED DEPRESSION TYPE: ICD-10-CM

## 2024-01-12 DIAGNOSIS — Z00.00 ENCOUNTER FOR WELLNESS EXAMINATION: ICD-10-CM

## 2024-01-12 PROBLEM — R03.0 ELEVATED BLOOD PRESSURE READING: Status: RESOLVED | Noted: 2023-08-02 | Resolved: 2024-01-12

## 2024-01-12 LAB
ALBUMIN SERPL-MCNC: 3.8 G/DL (ref 3.5–5)
ALBUMIN/GLOB SERPL: 1.1 RATIO (ref 1.1–2)
ALP SERPL-CCNC: 66 UNIT/L (ref 40–150)
ALT SERPL-CCNC: 15 UNIT/L (ref 0–55)
APPEARANCE UR: ABNORMAL
AST SERPL-CCNC: 17 UNIT/L (ref 5–34)
BACTERIA #/AREA URNS AUTO: ABNORMAL /HPF
BASOPHILS # BLD AUTO: 0.02 X10(3)/MCL
BASOPHILS NFR BLD AUTO: 0.3 %
BILIRUB SERPL-MCNC: 0.3 MG/DL
BILIRUB UR QL STRIP.AUTO: NEGATIVE
BUN SERPL-MCNC: 10.3 MG/DL (ref 7–18.7)
CALCIUM SERPL-MCNC: 8.9 MG/DL (ref 8.4–10.2)
CHLORIDE SERPL-SCNC: 107 MMOL/L (ref 98–107)
CHOLEST SERPL-MCNC: 176 MG/DL
CHOLEST/HDLC SERPL: 4 {RATIO} (ref 0–5)
CO2 SERPL-SCNC: 26 MMOL/L (ref 22–29)
COLOR UR AUTO: YELLOW
CREAT SERPL-MCNC: 0.84 MG/DL (ref 0.55–1.02)
DEPRECATED CALCIDIOL+CALCIFEROL SERPL-MC: 26.3 NG/ML (ref 30–80)
EOSINOPHIL # BLD AUTO: 0.09 X10(3)/MCL (ref 0–0.9)
EOSINOPHIL NFR BLD AUTO: 1.5 %
ERYTHROCYTE [DISTWIDTH] IN BLOOD BY AUTOMATED COUNT: 13.4 % (ref 11.5–17)
EST. AVERAGE GLUCOSE BLD GHB EST-MCNC: 93.9 MG/DL
GFR SERPLBLD CREATININE-BSD FMLA CKD-EPI: >60 MLS/MIN/1.73/M2
GLOBULIN SER-MCNC: 3.4 GM/DL (ref 2.4–3.5)
GLUCOSE SERPL-MCNC: 81 MG/DL (ref 74–100)
GLUCOSE UR QL STRIP.AUTO: NORMAL
HAV IGM SERPL QL IA: NONREACTIVE
HBA1C MFR BLD: 4.9 %
HBV CORE IGM SERPL QL IA: NONREACTIVE
HBV SURFACE AG SERPL QL IA: NONREACTIVE
HCT VFR BLD AUTO: 44.2 % (ref 37–47)
HCV AB SERPL QL IA: NONREACTIVE
HDLC SERPL-MCNC: 45 MG/DL (ref 35–60)
HGB BLD-MCNC: 13.8 G/DL (ref 12–16)
HIV 1+2 AB+HIV1 P24 AG SERPL QL IA: NONREACTIVE
HYALINE CASTS #/AREA URNS LPF: ABNORMAL /LPF
IMM GRANULOCYTES # BLD AUTO: 0.01 X10(3)/MCL (ref 0–0.04)
IMM GRANULOCYTES NFR BLD AUTO: 0.2 %
KETONES UR QL STRIP.AUTO: NEGATIVE
LDLC SERPL CALC-MCNC: 117 MG/DL (ref 50–140)
LEUKOCYTE ESTERASE UR QL STRIP.AUTO: 75
LYMPHOCYTES # BLD AUTO: 3.15 X10(3)/MCL (ref 0.6–4.6)
LYMPHOCYTES NFR BLD AUTO: 52.8 %
MCH RBC QN AUTO: 27.7 PG (ref 27–31)
MCHC RBC AUTO-ENTMCNC: 31.2 G/DL (ref 33–36)
MCV RBC AUTO: 88.8 FL (ref 80–94)
MONOCYTES # BLD AUTO: 0.43 X10(3)/MCL (ref 0.1–1.3)
MONOCYTES NFR BLD AUTO: 7.2 %
MUCOUS THREADS URNS QL MICRO: ABNORMAL /LPF
NEUTROPHILS # BLD AUTO: 2.27 X10(3)/MCL (ref 2.1–9.2)
NEUTROPHILS NFR BLD AUTO: 38 %
NITRITE UR QL STRIP.AUTO: NEGATIVE
NRBC BLD AUTO-RTO: 0 %
PH UR STRIP.AUTO: 6 [PH]
PLATELET # BLD AUTO: 226 X10(3)/MCL (ref 130–400)
PMV BLD AUTO: 10.8 FL (ref 7.4–10.4)
POTASSIUM SERPL-SCNC: 4.4 MMOL/L (ref 3.5–5.1)
PROT SERPL-MCNC: 7.2 GM/DL (ref 6.4–8.3)
PROT UR QL STRIP.AUTO: ABNORMAL
RBC # BLD AUTO: 4.98 X10(6)/MCL (ref 4.2–5.4)
RBC #/AREA URNS AUTO: ABNORMAL /HPF
RBC UR QL AUTO: NEGATIVE
SODIUM SERPL-SCNC: 138 MMOL/L (ref 136–145)
SP GR UR STRIP.AUTO: 1.03 (ref 1–1.03)
SQUAMOUS #/AREA URNS LPF: ABNORMAL /HPF
T PALLIDUM AB SER QL: NONREACTIVE
T4 FREE SERPL-MCNC: 0.87 NG/DL (ref 0.7–1.48)
TRIGL SERPL-MCNC: 68 MG/DL (ref 37–140)
TSH SERPL-ACNC: 0.71 UIU/ML (ref 0.35–4.94)
UROBILINOGEN UR STRIP-ACNC: NORMAL
VIT B12 SERPL-MCNC: 505 PG/ML (ref 213–816)
VLDLC SERPL CALC-MCNC: 14 MG/DL
WBC # SPEC AUTO: 5.97 X10(3)/MCL (ref 4.5–11.5)
WBC #/AREA URNS AUTO: ABNORMAL /HPF

## 2024-01-12 PROCEDURE — 87086 URINE CULTURE/COLONY COUNT: CPT | Performed by: NURSE PRACTITIONER

## 2024-01-12 PROCEDURE — 85025 COMPLETE CBC W/AUTO DIFF WBC: CPT | Performed by: NURSE PRACTITIONER

## 2024-01-12 PROCEDURE — 36415 COLL VENOUS BLD VENIPUNCTURE: CPT | Performed by: NURSE PRACTITIONER

## 2024-01-12 PROCEDURE — 87389 HIV-1 AG W/HIV-1&-2 AB AG IA: CPT | Performed by: NURSE PRACTITIONER

## 2024-01-12 PROCEDURE — 84439 ASSAY OF FREE THYROXINE: CPT | Performed by: NURSE PRACTITIONER

## 2024-01-12 PROCEDURE — 3074F SYST BP LT 130 MM HG: CPT | Mod: CPTII,,, | Performed by: NURSE PRACTITIONER

## 2024-01-12 PROCEDURE — 82306 VITAMIN D 25 HYDROXY: CPT | Performed by: NURSE PRACTITIONER

## 2024-01-12 PROCEDURE — 1160F RVW MEDS BY RX/DR IN RCRD: CPT | Mod: CPTII,,, | Performed by: NURSE PRACTITIONER

## 2024-01-12 PROCEDURE — 84443 ASSAY THYROID STIM HORMONE: CPT | Performed by: NURSE PRACTITIONER

## 2024-01-12 PROCEDURE — 80053 COMPREHEN METABOLIC PANEL: CPT | Performed by: NURSE PRACTITIONER

## 2024-01-12 PROCEDURE — 82607 VITAMIN B-12: CPT | Performed by: NURSE PRACTITIONER

## 2024-01-12 PROCEDURE — 3008F BODY MASS INDEX DOCD: CPT | Mod: CPTII,,, | Performed by: NURSE PRACTITIONER

## 2024-01-12 PROCEDURE — 81001 URINALYSIS AUTO W/SCOPE: CPT | Performed by: NURSE PRACTITIONER

## 2024-01-12 PROCEDURE — 80074 ACUTE HEPATITIS PANEL: CPT | Performed by: NURSE PRACTITIONER

## 2024-01-12 PROCEDURE — 99214 OFFICE O/P EST MOD 30 MIN: CPT | Mod: S$PBB,,, | Performed by: NURSE PRACTITIONER

## 2024-01-12 PROCEDURE — 80061 LIPID PANEL: CPT | Performed by: NURSE PRACTITIONER

## 2024-01-12 PROCEDURE — 3079F DIAST BP 80-89 MM HG: CPT | Mod: CPTII,,, | Performed by: NURSE PRACTITIONER

## 2024-01-12 PROCEDURE — 86780 TREPONEMA PALLIDUM: CPT | Performed by: NURSE PRACTITIONER

## 2024-01-12 PROCEDURE — 83036 HEMOGLOBIN GLYCOSYLATED A1C: CPT | Performed by: NURSE PRACTITIONER

## 2024-01-12 PROCEDURE — 1159F MED LIST DOCD IN RCRD: CPT | Mod: CPTII,,, | Performed by: NURSE PRACTITIONER

## 2024-01-12 PROCEDURE — 99215 OFFICE O/P EST HI 40 MIN: CPT | Mod: PBBFAC,PN | Performed by: NURSE PRACTITIONER

## 2024-01-12 RX ORDER — ZINC GLUCONATE 50 MG
50 TABLET ORAL DAILY
COMMUNITY

## 2024-01-12 RX ORDER — TRIAMCINOLONE ACETONIDE 0.25 MG/G
CREAM TOPICAL 2 TIMES DAILY
Qty: 15 G | Refills: 1 | Status: SHIPPED | OUTPATIENT
Start: 2024-01-12

## 2024-01-12 NOTE — PROGRESS NOTES
Patient Name: Fátima Brown     : 1985    MRN: [id number]     Subjective:     Patient ID: Fátima Brown is a 38 y.o. female.    Chief Complaint:   Chief Complaint   Patient presents with    Follow-up     Pt here for follow up. Pt c/o heavy menses x two the month of Dec with severe cramps.        HPI: 24: Routine FU but c/o heavy menses x 2 in December with severe cramps but did not go to ER at that time, states it was scary but didn't have time to go to ER due to work schedule, etc.  Has not happened again this month.    Has had ovarian cysts in past dx by her last GYN.  Does not currently have GYN here in [place].  Is amenable to referral.  PAP is due as well.  C/o bilateral hands with dryness and rash due to washing hands in chemicals a lot.  Asking for ointment today.  Is taking Vitamin D.  C/o continued fatigue.  States she drinks water t/out day.        23:  FU for review of labs, missed a few appointments.  States that she has noticed increased appetite since starting her Vitamin D.  She started it in July and noticed she was hungrier than usual.  She has questions about Genital Herpes diagnosis today.       23:  Establish care with PCP.  Right chest wall pain intermittent and sporadic.  None today.  Not sure how long it lasts.  No n/v/diaphoresis, radiation.           ROS:      Review of Systems   Constitutional:  Positive for malaise/fatigue.   HENT: Negative.     Eyes: Negative.    Respiratory: Negative.     Cardiovascular: Negative.    Gastrointestinal: Negative.    Genitourinary: Negative.    Musculoskeletal: Negative.    Skin:  Positive for rash.   Neurological: Negative.    Endo/Heme/Allergies: Negative.    Psychiatric/Behavioral: Negative.     All other systems reviewed and are negative.           History:     Past Medical History:   Diagnosis Date    Anxiety     Elevated blood pressure reading 2023    Right-sided chest wall pain 2023        History reviewed. No  "pertinent surgical history.    Family History   Problem Relation Age of Onset    Hypertension Mother     Hypertension Father     Diabetes Father         Social History     Tobacco Use    Smoking status: Every Day     Current packs/day: 0.50     Types: Cigarettes    Smokeless tobacco: Never   Substance and Sexual Activity    Alcohol use: Yes     Comment: occasionaly    Drug use: Never    Sexual activity: Yes     Partners: Male       Current Outpatient Medications   Medication Instructions    ALPRAZolam (XANAX) 1 mg, Oral, Daily PRN    ascorbic acid/multivit-min (VITAMIN C FIZZY DRINK ORAL) 1 Bag, Oral, Daily    cholecalciferol (vitamin D3) 50,000 Units, Oral, Every 7 days    clindamycin (CLEOCIN) 150 MG capsule SMARTSI Capsule(s) By Mouth Every 5 Hours    FLUoxetine 40 mg, Oral    triamcinolone acetonide 0.025% (KENALOG) 0.025 % cream Topical (Top), 2 times daily    valACYclovir (VALTREX) 1,000 mg, Oral, 2 times daily    zinc gluconate 50 mg, Oral, Daily        Review of patient's allergies indicates:   Allergen Reactions    Ibuprofen Itching       Objective:     Visit Vitals  /80 (BP Location: Left arm, Patient Position: Sitting, BP Method: Large (Automatic))   Pulse 88   Temp 98 °F (36.7 °C) (Oral)   Resp 18   Ht 4' 9" (1.448 m)   Wt 79.8 kg (176 lb)   LMP 2023 (Approximate)   SpO2 99%   BMI 38.09 kg/m²       Physical Examination:     Physical Exam  Vitals and nursing note reviewed.   HENT:      Head: Normocephalic and atraumatic.   Cardiovascular:      Rate and Rhythm: Normal rate and regular rhythm.      Pulses: Normal pulses.      Heart sounds: Normal heart sounds.   Pulmonary:      Breath sounds: Normal breath sounds.   Musculoskeletal:         General: Normal range of motion.      Cervical back: Normal range of motion.   Skin:     General: Skin is warm and dry.      Findings: Rash present.   Neurological:      General: No focal deficit present.      Mental Status: She is alert and oriented to " person, place, and time.   Psychiatric:         Mood and Affect: Mood normal.         Lab Results:     Chemistry:  Lab Results   Component Value Date     06/12/2023    K 4.1 06/12/2023    CHLORIDE 105 06/12/2023    BUN 10.6 06/12/2023    CREATININE 0.93 06/12/2023    EGFRNORACEVR >60 06/12/2023    GLUCOSE 91 06/12/2023    CALCIUM 8.8 06/12/2023    ALKPHOS 57 06/12/2023    LABPROT 7.1 06/12/2023    ALBUMIN 3.7 06/12/2023    AST 13 06/12/2023    ALT 12 06/12/2023    [id number] 17.3 (L) 06/12/2023    TSH 0.767 06/12/2023    IDKLJQ3DDIC 0.85 06/12/2023        Lab Results   Component Value Date    HGBA1C 5.2 06/12/2023        Hematology:  Lab Results   Component Value Date    WBC 5.62 06/12/2023    HGB 13.7 06/12/2023    HCT 42.9 06/12/2023     06/12/2023       Lipid Panel:  Lab Results   Component Value Date    CHOL 176 06/12/2023    HDL 41 06/12/2023    .00 06/12/2023    TRIG 87 06/12/2023    TOTALCHOLEST 4 06/12/2023        Urine:  Lab Results   Component Value Date    COLORUA Yellow 06/12/2023    APPEARANCEUA Clear 06/12/2023    SGUA 1.028 06/12/2023    PHUA 6.0 06/12/2023    PROTEINUA Trace (A) 06/12/2023    GLUCOSEUA Normal 06/12/2023    KETONESUA Negative 06/12/2023    BLOODUA Negative 06/12/2023    NITRITESUA Negative 06/12/2023    LEUKOCYTESUR Negative 06/12/2023    RBCUA 0-5 06/12/2023    WBCUA 0-5 06/12/2023    BACTERIA Occ (A) 06/12/2023    SQEPUA Moderate (A) 06/12/2023    HYALINECASTS 0-2 (A) 06/12/2023        Assessment:          ICD-10-CM ICD-9-CM   1. Menorrhagia with regular cycle  N92.0 626.2   2. Encounter for wellness examination  Z00.00 V70.0   3. Genital herpes simplex, unspecified site  A60.00 054.10   4. Contact dermatitis due to chemicals  L25.3 692.4   5. Depression, unspecified depression type  F32.A 311   6. Anxiety  F41.9 300.00        Plan:     1. Menorrhagia with regular cycle  Assessment & Plan:  Referral to GYN    Orders:  -     Ambulatory referral/consult to  Gynecology; Future; Expected date: 01/19/2024    2. Encounter for wellness examination  -     Lipid Panel  -     CBC Auto Differential  -     Comprehensive Metabolic Panel  -     Urinalysis  -     Hemoglobin A1C  -     TSH  -     T4, Free  -     Vitamin D  -     Vitamin B12  -     Hepatitis Panel, Acute  -     HIV 1/2 Ag/Ab (4th Gen)  -     SYPHILIS ANTIBODY (WITH REFLEX RPR)    3. Genital herpes simplex, unspecified site  Assessment & Plan:  Chronic stable problem. Continue Valtrex.        4. Contact dermatitis due to chemicals  Assessment & Plan:  Triamcinolone cream to hands bilaterally BID prescribed and sent to pharmacy  New problem to provider.   Consider referral to dermatology if no improvement  Discussed OTC ointments/lotions such as Eucerin, Natividad's hand cream, etc.     Orders:  -     triamcinolone acetonide 0.025% (KENALOG) 0.025 % cream; Apply topically 2 (two) times daily.  Dispense: 15 g; Refill: 1    5. Depression, unspecified depression type  Assessment & Plan:  Followed by psych        6. Anxiety  Assessment & Plan:  Followed by psych           Follow up in about 6 months (around 7/12/2024) for Wellness.    Future Appointments   Date Time Provider Department Center   7/12/2024  8:15 AM Barbra Jean Baptiste FNP [facility]        RAMEZ Farah

## 2024-01-12 NOTE — ASSESSMENT & PLAN NOTE
Triamcinolone cream to hands bilaterally BID prescribed and sent to pharmacy  New problem to provider.   Consider referral to dermatology if no improvement  Discussed OTC ointments/lotions such as Eucerin, Natividad's hand cream, etc.

## 2024-01-14 LAB — BACTERIA UR CULT: NORMAL

## 2024-01-19 DIAGNOSIS — E55.9 VITAMIN D DEFICIENCY: ICD-10-CM

## 2024-01-19 RX ORDER — ASPIRIN 325 MG
50000 TABLET, DELAYED RELEASE (ENTERIC COATED) ORAL
Qty: 12 CAPSULE | Refills: 0 | Status: SHIPPED | OUTPATIENT
Start: 2024-01-19

## 2024-03-27 ENCOUNTER — HOSPITAL ENCOUNTER (EMERGENCY)
Facility: HOSPITAL | Age: 39
Discharge: HOME | End: 2024-03-28
Attending: FAMILY MEDICINE
Payer: MEDICAID

## 2024-03-27 DIAGNOSIS — M94.0 COSTOCHONDRITIS, ACUTE: Primary | ICD-10-CM

## 2024-03-27 DIAGNOSIS — R07.9 CHEST PAIN: ICD-10-CM

## 2024-03-27 LAB
B-HCG UR QL: NEGATIVE
CTP QC/QA: YES

## 2024-03-27 PROCEDURE — 93005 ELECTROCARDIOGRAM TRACING: CPT

## 2024-03-27 PROCEDURE — 81025 URINE PREGNANCY TEST: CPT | Performed by: FAMILY MEDICINE

## 2024-03-27 PROCEDURE — 99285 EMERGENCY DEPT VISIT HI MDM: CPT | Mod: 25

## 2024-03-27 RX ORDER — KETOROLAC TROMETHAMINE 30 MG/ML
30 INJECTION, SOLUTION INTRAMUSCULAR; INTRAVENOUS
Status: COMPLETED | OUTPATIENT
Start: 2024-03-28 | End: 2024-03-28

## 2024-03-28 VITALS
SYSTOLIC BLOOD PRESSURE: 101 MMHG | HEIGHT: 57 IN | HEART RATE: 54 BPM | RESPIRATION RATE: 22 BRPM | DIASTOLIC BLOOD PRESSURE: 72 MMHG | BODY MASS INDEX: 37.48 KG/M2 | WEIGHT: 173.75 LBS | OXYGEN SATURATION: 99 % | TEMPERATURE: 98 F

## 2024-03-28 LAB
ALBUMIN SERPL-MCNC: 3.5 G/DL (ref 3.5–5)
ALBUMIN/GLOB SERPL: 1.1 RATIO (ref 1.1–2)
ALP SERPL-CCNC: 65 UNIT/L (ref 40–150)
ALT SERPL-CCNC: 14 UNIT/L (ref 0–55)
AST SERPL-CCNC: 13 UNIT/L (ref 5–34)
BASOPHILS # BLD AUTO: 0.03 X10(3)/MCL
BASOPHILS NFR BLD AUTO: 0.3 %
BILIRUB SERPL-MCNC: 0.2 MG/DL
BUN SERPL-MCNC: 17.7 MG/DL (ref 7–18.7)
CALCIUM SERPL-MCNC: 9 MG/DL (ref 8.4–10.2)
CHLORIDE SERPL-SCNC: 107 MMOL/L (ref 98–107)
CK MB SERPL-MCNC: 2.2 NG/ML
CK SERPL-CCNC: 156 U/L (ref 29–168)
CO2 SERPL-SCNC: 23 MMOL/L (ref 22–29)
CREAT SERPL-MCNC: 0.89 MG/DL (ref 0.55–1.02)
EOSINOPHIL # BLD AUTO: 0.16 X10(3)/MCL (ref 0–0.9)
EOSINOPHIL NFR BLD AUTO: 1.8 %
ERYTHROCYTE [DISTWIDTH] IN BLOOD BY AUTOMATED COUNT: 13 % (ref 11.5–17)
GFR SERPLBLD CREATININE-BSD FMLA CKD-EPI: >60 MLS/MIN/1.73/M2
GLOBULIN SER-MCNC: 3.2 GM/DL (ref 2.4–3.5)
GLUCOSE SERPL-MCNC: 101 MG/DL (ref 74–100)
HCT VFR BLD AUTO: 42 % (ref 37–47)
HGB BLD-MCNC: 13.6 G/DL (ref 12–16)
IMM GRANULOCYTES # BLD AUTO: 0.01 X10(3)/MCL (ref 0–0.04)
IMM GRANULOCYTES NFR BLD AUTO: 0.1 %
LYMPHOCYTES # BLD AUTO: 5.1 X10(3)/MCL (ref 0.6–4.6)
LYMPHOCYTES NFR BLD AUTO: 59 %
MCH RBC QN AUTO: 27.7 PG (ref 27–31)
MCHC RBC AUTO-ENTMCNC: 32.4 G/DL (ref 33–36)
MCV RBC AUTO: 85.5 FL (ref 80–94)
MONOCYTES # BLD AUTO: 0.58 X10(3)/MCL (ref 0.1–1.3)
MONOCYTES NFR BLD AUTO: 6.7 %
NEUTROPHILS # BLD AUTO: 2.77 X10(3)/MCL (ref 2.1–9.2)
NEUTROPHILS NFR BLD AUTO: 32.1 %
NRBC BLD AUTO-RTO: 0 %
PLATELET # BLD AUTO: 228 X10(3)/MCL (ref 130–400)
PMV BLD AUTO: 10.4 FL (ref 7.4–10.4)
POTASSIUM SERPL-SCNC: 3.4 MMOL/L (ref 3.5–5.1)
PROT SERPL-MCNC: 6.7 GM/DL (ref 6.4–8.3)
RBC # BLD AUTO: 4.91 X10(6)/MCL (ref 4.2–5.4)
SODIUM SERPL-SCNC: 138 MMOL/L (ref 136–145)
TROPONIN I SERPL-MCNC: <0.01 NG/ML (ref 0–0.04)
WBC # SPEC AUTO: 8.65 X10(3)/MCL (ref 4.5–11.5)

## 2024-03-28 PROCEDURE — 82553 CREATINE MB FRACTION: CPT | Performed by: FAMILY MEDICINE

## 2024-03-28 PROCEDURE — 85025 COMPLETE CBC W/AUTO DIFF WBC: CPT | Performed by: FAMILY MEDICINE

## 2024-03-28 PROCEDURE — 63600175 PHARM REV CODE 636 W HCPCS: Performed by: FAMILY MEDICINE

## 2024-03-28 PROCEDURE — 82550 ASSAY OF CK (CPK): CPT | Performed by: FAMILY MEDICINE

## 2024-03-28 PROCEDURE — 96372 THER/PROPH/DIAG INJ SC/IM: CPT | Performed by: FAMILY MEDICINE

## 2024-03-28 PROCEDURE — 80053 COMPREHEN METABOLIC PANEL: CPT | Performed by: FAMILY MEDICINE

## 2024-03-28 PROCEDURE — 84484 ASSAY OF TROPONIN QUANT: CPT | Performed by: FAMILY MEDICINE

## 2024-03-28 RX ORDER — NAPROXEN 500 MG/1
500 TABLET ORAL 2 TIMES DAILY PRN
Qty: 20 TABLET | Refills: 0 | Status: SHIPPED | OUTPATIENT
Start: 2024-03-28 | End: 2024-04-07

## 2024-03-28 RX ADMIN — KETOROLAC TROMETHAMINE 30 MG: 30 INJECTION, SOLUTION INTRAMUSCULAR; INTRAVENOUS at 12:03

## 2024-03-28 NOTE — ED PROVIDER NOTES
Encounter Date: 3/27/2024       History     Chief Complaint   Patient presents with    Chest Pain     Pt c/o chest pain x 2 days, worse with a deep breath, pt reports she can't describe pain. Vss      Patient was a 38-year-old female presents emergency room complaints of anterior chest pain.  Patient reports that she thought it may have been related to her anxiety, so when she got off work today, took Xanax at home, but reports that her chest pain still persists.  Reports with specific movements makes the pain worse along with taking deep breaths.  Denies abdominal pain nausea or vomiting.  Denies shortness of breath.  Denies recent upper respiratory infection.  Reports history of coronary artery disease in the family, so patient decided come the emergency room for evaluation.    The history is provided by the patient.     Review of patient's allergies indicates:   Allergen Reactions    Ibuprofen Itching     Past Medical History:   Diagnosis Date    Anxiety     Elevated blood pressure reading 08/02/2023    Right-sided chest wall pain 06/12/2023     History reviewed. No pertinent surgical history.  Family History   Problem Relation Age of Onset    Hypertension Mother     Hypertension Father     Diabetes Father      Social History     Tobacco Use    Smoking status: Every Day     Current packs/day: 0.50     Average packs/day: 0.5 packs/day for 20.2 years (10.1 ttl pk-yrs)     Types: Cigarettes     Start date: 2004    Smokeless tobacco: Never   Substance Use Topics    Alcohol use: Yes     Comment: occasionaly    Drug use: Never     Review of Systems   Constitutional:  Negative for chills, fatigue and fever.   HENT:  Negative for ear pain, rhinorrhea and sore throat.    Eyes:  Negative for photophobia and pain.   Respiratory:  Negative for cough, shortness of breath and wheezing.    Cardiovascular:  Positive for chest pain.   Gastrointestinal:  Negative for abdominal pain, diarrhea, nausea and vomiting.   Genitourinary:   Negative for dysuria.   Neurological:  Negative for dizziness, weakness and headaches.   All other systems reviewed and are negative.      Physical Exam     Initial Vitals [03/27/24 2330]   BP Pulse Resp Temp SpO2   127/80 63 20 98.1 °F (36.7 °C) 100 %      MAP       --         Physical Exam    Nursing note and vitals reviewed.  Constitutional: She appears well-developed and well-nourished. No distress.   HENT:   Head: Normocephalic and atraumatic.   Mouth/Throat: Oropharynx is clear and moist.   Eyes: Conjunctivae and EOM are normal. Pupils are equal, round, and reactive to light.   Neck: Neck supple.   Normal range of motion.  Cardiovascular:  Normal rate, regular rhythm, normal heart sounds and intact distal pulses.     Exam reveals no friction rub.       No murmur heard.  Pulmonary/Chest: No respiratory distress. She has no wheezes. She has no rhonchi. She has no rales. She exhibits tenderness.   Abdominal: Abdomen is soft. Bowel sounds are normal. She exhibits no distension. There is no abdominal tenderness. There is no rebound and no guarding.   Musculoskeletal:         General: Normal range of motion.      Cervical back: Normal range of motion and neck supple.     Neurological: She is alert and oriented to person, place, and time.   Skin: Skin is warm and dry. Capillary refill takes less than 2 seconds. No erythema.   Psychiatric: She has a normal mood and affect. Her behavior is normal. Judgment and thought content normal.         ED Course   Procedures  Labs Reviewed   COMPREHENSIVE METABOLIC PANEL - Abnormal; Notable for the following components:       Result Value    Potassium Level 3.4 (*)     Glucose Level 101 (*)     All other components within normal limits   CBC WITH DIFFERENTIAL - Abnormal; Notable for the following components:    MCHC 32.4 (*)     Lymph # 5.10 (*)     All other components within normal limits   TROPONIN I - Normal   CK-MB - Normal   CK - Normal   CBC W/ AUTO DIFFERENTIAL     "Narrative:     The following orders were created for panel order CBC Auto Differential.  Procedure                               Abnormality         Status                     ---------                               -----------         ------                     CBC with Differential[[id number]]        Abnormal            Final result                 Please view results for these tests on the individual orders.   POCT URINE PREGNANCY     EKG Readings: (Independently Interpreted)   My Independent EKG Interpretation  03/27/2024 11:47 PM  Rate: 60 bpm  Rhythm: Sinus  Axis: Normal  Intervals: Normal  ST Changes: None  Impression: Normal sinus rhythm, normal EKG.           Imaging Results              X-Ray Chest 1 View (Preliminary result)  Result time 03/28/24 01:04:15      Wet Read by Prabhjot Guerrero MD (03/28/24 01:04:15, [facility], Emergency Medicine)    No acute abnormality appreciated.                                     Medications   ketorolac injection 30 mg (30 mg Intramuscular Given 3/28/24 0007)     Medical Decision Making  Patient is a 38-year-old female presents emergency room complaints of anterior chest pain.  Patient was reproducible tenderness to palpation mainly on the right costosternal border.  No crepitus.  Physical exam is normal otherwise.  Findings likely related to costochondritis.  Discussed with the patient, and will obtain EKG as well as cardiac workup while patient was here in the emergency room due to family history.  Patient currently saturating 100% on room air, pulse rate 63.  Patient has listed an ibuprofen allergy.  She reports that she only has an allergy to the "stronger strength" ibuprofen, but can use ibuprofen over-the-counter.  Patient also reports that she also tolerates Aleve, and often uses aspirin over-the-counter.  Discussed with the patient, and will give a dose of Toradol here to help with the pain while obtain laboratory " evaluation.    Differential diagnosis: Atypical chest pain, costochondritis    Amount and/or Complexity of Data Reviewed  Labs: ordered.  Radiology: ordered and independent interpretation performed.    Risk  Prescription drug management.               ED Course as of 03/28/24 0105   Thu Mar 28, 2024   0105 No acute lab abnormality or xray abnormality appreciated.  Discussed results with the patient.  Stable for discharge to home.  ER precautions given for any acute worsening. [MW]      ED Course User Index  [MW] Prabhjot Guerrero MD                           Clinical Impression:  Final diagnoses:  [R07.9] Chest pain  [M94.0] Costochondritis, acute (Primary)          ED Disposition Condition    Discharge Stable          ED Prescriptions       Medication Sig Dispense Start Date End Date Auth. Provider    naproxen (NAPROSYN) 500 MG tablet Take 1 tablet (500 mg total) by mouth 2 (two) times daily as needed (pain). 20 tablet 3/28/2024 4/7/2024 Prabhjot Guerrero MD          Follow-up Information       Follow up With Specialties Details Why Contact Info    Barbra Jean Baptiste, FNP Nurse Practitioner   [address] [place] [id number] [phone]      [facility] Emergency Medicine  As needed, If symptoms worsen [address] [facility] [address] [phone]             Prabhjot Guerrero MD  03/28/24 0105

## 2024-04-02 LAB
OHS QRS DURATION: 86 MS
OHS QTC CALCULATION: 446 MS

## 2024-05-02 DIAGNOSIS — L25.3 CONTACT DERMATITIS DUE TO CHEMICALS: ICD-10-CM

## 2024-05-03 RX ORDER — TRIAMCINOLONE ACETONIDE 0.25 MG/G
1 CREAM TOPICAL 2 TIMES DAILY
Qty: 15 G | Refills: 1 | Status: SHIPPED | OUTPATIENT
Start: 2024-05-03

## 2024-06-27 DIAGNOSIS — E55.9 VITAMIN D DEFICIENCY: ICD-10-CM

## 2024-06-27 RX ORDER — ASPIRIN 325 MG
50000 TABLET, DELAYED RELEASE (ENTERIC COATED) ORAL
Qty: 12 CAPSULE | Refills: 0 | Status: SHIPPED | OUTPATIENT
Start: 2024-06-27

## 2024-07-12 ENCOUNTER — OFFICE VISIT (OUTPATIENT)
Dept: FAMILY MEDICINE | Facility: CLINIC | Age: 39
End: 2024-07-12
Payer: MEDICAID

## 2024-07-12 VITALS
RESPIRATION RATE: 18 BRPM | TEMPERATURE: 98 F | DIASTOLIC BLOOD PRESSURE: 85 MMHG | SYSTOLIC BLOOD PRESSURE: 135 MMHG | OXYGEN SATURATION: 98 % | HEART RATE: 65 BPM | HEIGHT: 57 IN | WEIGHT: 176 LBS | BODY MASS INDEX: 37.97 KG/M2

## 2024-07-12 DIAGNOSIS — A60.00 GENITAL HERPES SIMPLEX, UNSPECIFIED SITE: ICD-10-CM

## 2024-07-12 DIAGNOSIS — H66.92 LEFT OTITIS MEDIA, UNSPECIFIED OTITIS MEDIA TYPE: Primary | ICD-10-CM

## 2024-07-12 DIAGNOSIS — L98.9 SKIN LESION OF FACE: ICD-10-CM

## 2024-07-12 DIAGNOSIS — H66.90 ACUTE OTITIS MEDIA, UNSPECIFIED OTITIS MEDIA TYPE: ICD-10-CM

## 2024-07-12 PROBLEM — L25.3 CONTACT DERMATITIS DUE TO CHEMICALS: Status: RESOLVED | Noted: 2024-01-12 | Resolved: 2024-07-12

## 2024-07-12 PROCEDURE — 99214 OFFICE O/P EST MOD 30 MIN: CPT | Mod: PBBFAC,PN | Performed by: NURSE PRACTITIONER

## 2024-07-12 RX ORDER — AMOXICILLIN 875 MG/1
875 TABLET, FILM COATED ORAL EVERY 12 HOURS
Qty: 14 TABLET | Refills: 0 | Status: SHIPPED | OUTPATIENT
Start: 2024-07-12 | End: 2024-07-19

## 2024-07-12 NOTE — PROGRESS NOTES
Patient Name: Fátima Brown     : 1985    MRN: [id number]     Subjective:     Patient ID: Fátima Brown is a 38 y.o. female.    Chief Complaint:   Chief Complaint   Patient presents with    Follow-up     Pt is here for follow up. Pt c/o fluid to ears x two months. Pt also c/o bilateral ankle pain every AM with multiple skin lesions        HPI: 24:  Pt is here for follow up. Pt c/o fluid to ears x two months. Pt also c/o bilateral ankle pain every AM with multiple skin lesions.      Bilateral ankle pain-  Pt states that when she wakes and dresses for work both of her ankles hurt. She has never injured them.  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  Her shoes are not very supportive and she does not wear compression socks.      Bilateral ear pain-  For a couple of days the left ear has been hurting worse than the right.  She denies any history of allergies.      Lesions to skin of face-  Recently started.  He plucks hairs from her chin and this causes open lesions. No skin care routine reported.        Patient has GYN appointment upcoming. Due for PAP. Having problems with heavy menses and occasionally has pain to left lower abdomen. Hx ovarian cysts in past.           ROS:      Review of Systems   Gastrointestinal:  Positive for abdominal pain.   Genitourinary: Negative.    Skin:  Positive for rash.   All other systems reviewed and are negative.           History:     Past Medical History:   Diagnosis Date    Anxiety     Contact dermatitis due to chemicals 2024    Elevated blood pressure reading 2023    Right-sided chest wall pain 2023        History reviewed. No pertinent surgical history.    Family History   Problem Relation Name Age of Onset    Hypertension Mother      Hypertension Father      Diabetes Father          Social History     Tobacco Use    Smoking status: Every Day     Current packs/day: 0.50     Average  "packs/day: 0.5 packs/day for 20.5 years (10.3 ttl pk-yrs)     Types: Cigarettes     Start date: 2004    Smokeless tobacco: Never   Substance and Sexual Activity    Alcohol use: Yes     Comment: occasionaly    Drug use: Never    Sexual activity: Yes     Partners: Male       Current Outpatient Medications   Medication Instructions    ALPRAZolam (XANAX) 1 mg, Oral, Daily PRN    amoxicillin (AMOXIL) 875 mg, Oral, Every 12 hours    ascorbic acid/multivit-min (VITAMIN C FIZZY DRINK ORAL) 1 Bag, Oral, Daily    cholecalciferol (vitamin D3) 50,000 Units, Oral, Every 7 days    FLUoxetine 40 mg, Oral    triamcinolone acetonide 0.025% (KENALOG) 0.025 % cream 1 application , 2 times daily, Apply to affected area    valACYclovir (VALTREX) 1,000 mg, Oral, 2 times daily        Review of patient's allergies indicates:   Allergen Reactions    Ibuprofen Itching       Objective:     Visit Vitals  /85 (BP Location: Left arm, Patient Position: Sitting, BP Method: Large (Automatic))   Pulse 65   Temp 97.9 °F (36.6 °C) (Oral)   Resp 18   Ht 4' 9" (1.448 m)   Wt 79.8 kg (176 lb)   LMP 07/08/2024 (Approximate)   SpO2 98%   BMI 38.09 kg/m²       Physical Examination:     Physical Exam  Vitals reviewed.   Constitutional:       General: She is awake.      Appearance: Normal appearance. She is well-developed and well-groomed.   HENT:      Head: Normocephalic and atraumatic.      Right Ear: Hearing, tympanic membrane, ear canal and external ear normal.      Left Ear: Hearing normal. A middle ear effusion is present. There is no impacted cerumen. No foreign body. Tympanic membrane is not injected, scarred, perforated, erythematous, retracted or bulging.      Nose: Nose normal.      Mouth/Throat:      Lips: Pink.      Mouth: Mucous membranes are moist.   Cardiovascular:      Rate and Rhythm: Normal rate and regular rhythm.      Pulses: Normal pulses.      Heart sounds: Normal heart sounds, S1 normal and S2 normal.   Pulmonary:      Effort: " Pulmonary effort is normal.      Breath sounds: Normal breath sounds. No decreased breath sounds, wheezing, rhonchi or rales.   Musculoskeletal:      Cervical back: Full passive range of motion without pain and normal range of motion.      Right lower leg: No edema.      Left lower leg: No edema.   Skin:     General: Skin is warm and dry.   Neurological:      General: No focal deficit present.      Mental Status: She is alert and oriented to person, place, and time.      GCS: GCS eye subscore is 4. GCS verbal subscore is 5. GCS motor subscore is 6.      Cranial Nerves: Cranial nerves 2-12 are intact.      Sensory: Sensation is intact.      Motor: Motor function is intact.      Coordination: Coordination is intact.   Psychiatric:         Attention and Perception: Attention and perception normal.         Mood and Affect: Mood and affect normal.         Speech: Speech normal.         Behavior: Behavior is cooperative.         Thought Content: Thought content normal.         Cognition and Memory: Cognition and memory normal.         Lab Results:     Chemistry:  Lab Results   Component Value Date     03/28/2024    K 3.4 (L) 03/28/2024    BUN 17.7 03/28/2024    CREATININE 0.89 03/28/2024    EGFRNORACEVR >60 03/28/2024    GLUCOSE 101 (H) 03/28/2024    CALCIUM 9.0 03/28/2024    ALKPHOS 65 03/28/2024    LABPROT 6.7 03/28/2024    ALBUMIN 3.5 03/28/2024    AST 13 03/28/2024    ALT 14 03/28/2024    [id number] 26.3 (L) 01/12/2024    TSH 0.709 01/12/2024    [id number] 0.87 01/12/2024        Lab Results   Component Value Date    HGBA1C 4.9 01/12/2024        Hematology:  Lab Results   Component Value Date    WBC 8.65 03/28/2024    HGB 13.6 03/28/2024    HCT 42.0 03/28/2024     03/28/2024       Lipid Panel:  Lab Results   Component Value Date    CHOL 176 01/12/2024    HDL 45 01/12/2024    .00 01/12/2024    TRIG 68 01/12/2024    TOTALCHOLEST 4 01/12/2024        Urine:  Lab Results   Component Value Date     APPEARANCEUA Turbid (A) 01/12/2024    SGUA 1.030 01/12/2024    PROTEINUA 1+ (A) 01/12/2024    KETONESUA Negative 01/12/2024    LEUKOCYTESUR 75 (A) 01/12/2024    RBCUA 0-5 01/12/2024    WBCUA 11-20 (A) 01/12/2024    BACTERIA Occ (A) 01/12/2024    SQEPUA Many (A) 01/12/2024    HYALINECASTS 0-2 (A) 01/12/2024        Assessment:          ICD-10-CM ICD-9-CM   1. Left otitis media, unspecified otitis media type  H66.92 382.9   2. Skin lesion of face  L98.9 709.9   3. Acute otitis media, unspecified otitis media type  H66.90 382.9   4. Genital herpes simplex, unspecified site  A60.00 054.10        Plan:     1. Left otitis media, unspecified otitis media type  -     amoxicillin (AMOXIL) 875 MG tablet; Take 1 tablet (875 mg total) by mouth every 12 (twelve) hours. for 7 days  Dispense: 14 tablet; Refill: 0    2. Skin lesion of face  Assessment & Plan:  Discussed skin care routine today.  Advised benzoyl peroxide washes daily with moisturizer.   If worsens, return to clinic for referral to dermatology.        3. Acute otitis media, unspecified otitis media type  Assessment & Plan:  Amoxil 875 mg po BID x 7 days      4. Genital herpes simplex, unspecified site  Assessment & Plan:  Chronic stable problem. Continue Valtrex.    Pt states she has never had a lesion to her vagina.  She is not even really sure if she has genital herpes.  She is on suppressive therapy prior to seeing me.            Follow up in about 6 months (around 1/12/2025) for Wellness.    Future Appointments   Date Time Provider Department Center   9/30/2024  9:00 AM [facility]    1/13/2025  8:30 AM Barbra Jean Baptiste FNP [facility]        RAMEZ Farah

## 2024-07-12 NOTE — ASSESSMENT & PLAN NOTE
Chronic stable problem. Continue Valtrex.    Pt states she has never had a lesion to her vagina.  She is not even really sure if she has genital herpes.  She is on suppressive therapy prior to seeing me.

## 2024-07-12 NOTE — ASSESSMENT & PLAN NOTE
Discussed skin care routine today.  Advised benzoyl peroxide washes daily with moisturizer.   If worsens, return to clinic for referral to dermatology.

## 2024-08-26 ENCOUNTER — OFFICE VISIT (OUTPATIENT)
Dept: FAMILY MEDICINE | Facility: CLINIC | Age: 39
End: 2024-08-26
Payer: MEDICAID

## 2024-08-26 DIAGNOSIS — U07.1 COVID: Primary | ICD-10-CM

## 2024-08-26 PROCEDURE — 99499 UNLISTED E&M SERVICE: CPT | Mod: S$PBB,,, | Performed by: NURSE PRACTITIONER

## 2024-08-26 PROCEDURE — 99211 OFF/OP EST MAY X REQ PHY/QHP: CPT | Mod: PBBFAC,PN | Performed by: NURSE PRACTITIONER

## 2024-08-26 NOTE — PROGRESS NOTES
Pt tested Positive for COVID yesterday 8/25/24.  She is here for an appointment she scheduled for cough and sore throat.

## 2024-09-30 ENCOUNTER — OFFICE VISIT (OUTPATIENT)
Dept: GYNECOLOGY | Facility: CLINIC | Age: 39
End: 2024-09-30
Payer: MEDICAID

## 2024-09-30 VITALS
OXYGEN SATURATION: 100 % | DIASTOLIC BLOOD PRESSURE: 76 MMHG | WEIGHT: 178.38 LBS | TEMPERATURE: 98 F | BODY MASS INDEX: 38.48 KG/M2 | HEART RATE: 62 BPM | SYSTOLIC BLOOD PRESSURE: 116 MMHG | HEIGHT: 57 IN

## 2024-09-30 DIAGNOSIS — N92.0 MENORRHAGIA WITH REGULAR CYCLE: ICD-10-CM

## 2024-09-30 DIAGNOSIS — Z12.4 SCREENING FOR CERVICAL CANCER: Primary | ICD-10-CM

## 2024-09-30 PROCEDURE — 99213 OFFICE O/P EST LOW 20 MIN: CPT | Mod: PBBFAC

## 2024-09-30 PROCEDURE — 88174 CYTOPATH C/V AUTO IN FLUID: CPT

## 2024-09-30 PROCEDURE — 87624 HPV HI-RISK TYP POOLED RSLT: CPT

## 2024-09-30 RX ORDER — BUSPIRONE HYDROCHLORIDE 5 MG/1
5 TABLET ORAL 2 TIMES DAILY
COMMUNITY

## 2024-09-30 NOTE — PROGRESS NOTES
[facility] GYNECOLOGY CLINIC NOTE     Fátima Brown is a 38 y.o.  presenting to GYN clinic for AUB.    Patient with very short history of AUB. She reports that around 2023 she had 2 months of heavy menses (she was using 5 pads per day during that time). She reports cramping during this time as well when she was bleeding. Menses still lasted the normal 3-5 days. She has now returned to having normal monthly menses. No longer heavy, using 2-3 pads per day not soaked. She was under more stress at work when her menses were heavier.     Gyn Hx:   Triad: 11/R/3-5  Contraception: Not using anything, is sexually active with one male partner. Not actively trying to get pregnant   STI: Patient reports she was told she has HSV diagnosed by her physician via bloodwork and was prescribed Valtrex, but has never had a sore orally or genitally.   Pap: Denies hx of abnormal paps.     Gynecology  OB History          0    Para   0    Term   0       0    AB   0    Living   0         SAB   0    IAB   0    Ectopic   0    Multiple   0    Live Births   0                Past Medical History:   Diagnosis Date    Anxiety     Contact dermatitis due to chemicals 2024    Elevated blood pressure reading 2023    Right-sided chest wall pain 2023      Past Surgical History:   Procedure Laterality Date    HYSTEROSCOPY WITH DILATION AND CURETTAGE OF UTERUS N/A     Patient does not recall indication or pathology results        Current Outpatient Medications   Medication Instructions    ALPRAZolam (XANAX) 1 mg, Daily PRN    ascorbic acid/multivit-min (VITAMIN C FIZZY DRINK ORAL) 1 Bag, Daily    busPIRone (BUSPAR) 5 mg, 2 times daily    cholecalciferol (vitamin D3) 50,000 Units, Oral, Every 7 days    FLUoxetine 40 mg    triamcinolone acetonide 0.025% (KENALOG) 0.025 % cream 1 application , 2 times daily, Apply to affected area    valACYclovir (VALTREX) 1,000 mg, Oral, 2 times daily     Social History  "    Tobacco Use    Smoking status: Every Day     Current packs/day: 0.50     Average packs/day: 0.5 packs/day for 20.7 years (10.4 ttl pk-yrs)     Types: Cigarettes     Start date:     Smokeless tobacco: Never   Substance Use Topics    Alcohol use: Yes     Comment: occasionaly    Drug use: Never       Review of Systems  Pertinent items are noted in HPI.     Objective:     /76 (BP Location: Right arm)   Pulse 62   Temp 98 °F (36.7 °C)   Ht 4' 9" (1.448 m)   Wt 80.9 kg (178 lb 6.4 oz)   LMP 2024   SpO2 100%   BMI 38.61 kg/m²     Physical Exam:  Gen: Well-nourished, well-developed female appearing stated age. Alert, cooperative, in no acute distress.  CV: regular rate  Chest: no increased work of breathing  Abdomen: Soft, non-tender, no masses.  Extrem: Extremities normal, atraumatic, non-tender calves.  External genitalia: Normal female genetalia without lesion, discharge or tenderness.  Speculum Exam: Vaginal vault without discharge, nonodorous, no lesions/masses seen.  Cervical os visualized as closed, no lesions/masses.   Bimanual Exam: No cervical motion tenderness.  Uterus freely mobile.  Normal size uterus. No adnexal masses.  Nontender exam.     Note: Female chaperone present for entirety of exam.    Assessment:       38 y.o.  here for referral for AUB x2 months, resolved.  1. Screening for cervical cancer  Liquid-Based Pap Smear, Screening      2. Menorrhagia with regular cycle  Ambulatory referral/consult to Gynecology        Plan:     Problem List Items Addressed This Visit          Renal/    Menorrhagia with regular cycle     Patient with resolved AUB reported as heavier than normal menses with dysmenorrhea x2 months.   No further complaints or concerns.          Other Visit Diagnoses       Screening for cervical cancer    -  Primary    Relevant Orders    Liquid-Based Pap Smear, Screening          Return to clinic for annual in 1 year with NP.    Discussed patient and plan " with Dr. Mccoy.    Liliane Buckner, DO  [facility] PGY-3

## 2024-10-01 NOTE — ASSESSMENT & PLAN NOTE
Patient with resolved AUB reported as heavier than normal menses with dysmenorrhea x2 months.   No further complaints or concerns.

## 2024-10-03 LAB
HIGH RISK HPV: NEGATIVE
PSYCHE PATHOLOGY RESULT: NORMAL

## 2024-10-04 DIAGNOSIS — E55.9 VITAMIN D DEFICIENCY: ICD-10-CM

## 2024-10-04 RX ORDER — ASPIRIN 325 MG
50000 TABLET, DELAYED RELEASE (ENTERIC COATED) ORAL
Qty: 12 CAPSULE | Refills: 0 | Status: SHIPPED | OUTPATIENT
Start: 2024-10-04

## 2024-11-18 ENCOUNTER — OFFICE VISIT (OUTPATIENT)
Dept: FAMILY MEDICINE | Facility: CLINIC | Age: 39
End: 2024-11-18
Payer: MEDICAID

## 2024-11-18 ENCOUNTER — TELEPHONE (OUTPATIENT)
Dept: FAMILY MEDICINE | Facility: CLINIC | Age: 39
End: 2024-11-18

## 2024-11-18 VITALS
TEMPERATURE: 98 F | RESPIRATION RATE: 18 BRPM | DIASTOLIC BLOOD PRESSURE: 83 MMHG | OXYGEN SATURATION: 98 % | HEART RATE: 73 BPM | BODY MASS INDEX: 38.19 KG/M2 | HEIGHT: 57 IN | SYSTOLIC BLOOD PRESSURE: 116 MMHG | WEIGHT: 177 LBS

## 2024-11-18 DIAGNOSIS — J00 ACUTE NASOPHARYNGITIS: Primary | ICD-10-CM

## 2024-11-18 DIAGNOSIS — L98.9 SKIN LESIONS: ICD-10-CM

## 2024-11-18 PROBLEM — H66.90 ACUTE OTITIS MEDIA: Status: RESOLVED | Noted: 2024-07-12 | Resolved: 2024-11-18

## 2024-11-18 LAB
FLUAV AG UPPER RESP QL IA.RAPID: NOT DETECTED
FLUBV AG UPPER RESP QL IA.RAPID: NOT DETECTED
SARS-COV-2 RNA RESP QL NAA+PROBE: NOT DETECTED

## 2024-11-18 PROCEDURE — 1160F RVW MEDS BY RX/DR IN RCRD: CPT | Mod: CPTII,,, | Performed by: NURSE PRACTITIONER

## 2024-11-18 PROCEDURE — 99214 OFFICE O/P EST MOD 30 MIN: CPT | Mod: PBBFAC,PN | Performed by: NURSE PRACTITIONER

## 2024-11-18 PROCEDURE — 1159F MED LIST DOCD IN RCRD: CPT | Mod: CPTII,,, | Performed by: NURSE PRACTITIONER

## 2024-11-18 PROCEDURE — 3044F HG A1C LEVEL LT 7.0%: CPT | Mod: CPTII,,, | Performed by: NURSE PRACTITIONER

## 2024-11-18 PROCEDURE — 3008F BODY MASS INDEX DOCD: CPT | Mod: CPTII,,, | Performed by: NURSE PRACTITIONER

## 2024-11-18 PROCEDURE — 3074F SYST BP LT 130 MM HG: CPT | Mod: CPTII,,, | Performed by: NURSE PRACTITIONER

## 2024-11-18 PROCEDURE — 0240U COVID/FLU A&B PCR: CPT | Performed by: NURSE PRACTITIONER

## 2024-11-18 PROCEDURE — 99214 OFFICE O/P EST MOD 30 MIN: CPT | Mod: S$PBB,,, | Performed by: NURSE PRACTITIONER

## 2024-11-18 PROCEDURE — 3079F DIAST BP 80-89 MM HG: CPT | Mod: CPTII,,, | Performed by: NURSE PRACTITIONER

## 2024-11-18 PROCEDURE — 96372 THER/PROPH/DIAG INJ SC/IM: CPT | Mod: PBBFAC,PN

## 2024-11-18 RX ORDER — LORATADINE 10 MG/1
10 TABLET ORAL DAILY
Qty: 30 TABLET | Refills: 3 | Status: SHIPPED | OUTPATIENT
Start: 2024-11-18

## 2024-11-18 RX ORDER — ALBUTEROL SULFATE 90 UG/1
1 INHALANT RESPIRATORY (INHALATION) EVERY 6 HOURS PRN
COMMUNITY
Start: 2024-08-07

## 2024-11-18 RX ORDER — METHYLPREDNISOLONE 4 MG/1
TABLET ORAL
Qty: 21 EACH | Refills: 0 | Status: SHIPPED | OUTPATIENT
Start: 2024-11-19 | End: 2024-12-09

## 2024-11-18 RX ORDER — MUPIROCIN 20 MG/G
OINTMENT TOPICAL 3 TIMES DAILY
Qty: 22 G | Refills: 3 | Status: SHIPPED | OUTPATIENT
Start: 2024-11-18

## 2024-11-18 RX ORDER — FLUTICASONE PROPIONATE 50 MCG
1 SPRAY, SUSPENSION (ML) NASAL DAILY
Qty: 18.2 ML | Refills: 3 | Status: SHIPPED | OUTPATIENT
Start: 2024-11-18

## 2024-11-18 RX ORDER — DEXAMETHASONE SODIUM PHOSPHATE 4 MG/ML
4 INJECTION, SOLUTION INTRA-ARTICULAR; INTRALESIONAL; INTRAMUSCULAR; INTRAVENOUS; SOFT TISSUE ONCE
Status: COMPLETED | OUTPATIENT
Start: 2024-11-18 | End: 2024-11-18

## 2024-11-18 RX ADMIN — DEXAMETHASONE SODIUM PHOSPHATE 4 MG: 4 INJECTION, SOLUTION INTRA-ARTICULAR; INTRALESIONAL; INTRAMUSCULAR; INTRAVENOUS; SOFT TISSUE at 07:11

## 2024-11-18 NOTE — ASSESSMENT & PLAN NOTE
Discussed skin care routine today.  Advised benzoyl peroxide washes daily with moisturizer.   If worsens, return to clinic for referral to dermatology.      Patient with lesions to bilateral lower extremities, back and arms. Thought were due to fleas. States her apartment gets evaluated for bed bugs frequently and it is not that. States vet told her that the animals did not have fleas. Lesions appear to be insect bites with scabs noted and some hyperpigmentation, some are open wounds from scratching.    Mupirocin ointment prescribed today.

## 2024-11-18 NOTE — PROGRESS NOTES
Patient Name: Fátima Brown     : 1985    MRN: [id number]     Subjective:     Patient ID: Fátima Brown is a 38 y.o. female.    Chief Complaint:   Chief Complaint   Patient presents with    Follow-up     Pt here with reports of cold with nightly cough since 11/15/2024. Pt encouraged to report to urgent care but refused        HPI: 24: Pt here with reports of cold with nightly cough since 11/15/2024. Pt encouraged to report to urgent care but refused.  Denies fever. Mainly coughing only at night and improves during the day.  Started coughing up green productive sputum but this morning was clear. Today would be Day 4 of symptoms. She does not sound to have nasal congestion at this time. She does complain of itching to left ear and watery drainage at times.   Also has diffuse scattered skin lesions to chin, upper back, legs and arms that appear to be some type of insect bite and have scabs. States they start out as red bumps and then they turn into sores. Has ruled out bed bugs. Not thinking it is mosquito bites either.         24:  Pt is here for follow up. Pt c/o fluid to ears x two months. Pt also c/o bilateral ankle pain every AM with multiple skin lesions.      Bilateral ankle pain-  Pt states that when she wakes and dresses for work both of her ankles hurt. She has never injured them.  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  Her shoes are not very supportive and she does not wear compression socks.      Bilateral ear pain-  For a couple of days the left ear has been hurting worse than the right.  She denies any history of allergies.      Lesions to skin of face-  Recently started.  He plucks hairs from her chin and this causes open lesions. No skin care routine reported.        Patient has GYN appointment upcoming. Due for PAP. Having problems with heavy menses and occasionally has pain to left lower abdomen. Hx ovarian cysts  in past.             ROS:      Review of Systems   HENT:  Positive for congestion.    Respiratory:  Positive for cough.         History:     Past Medical History:   Diagnosis Date    Acute otitis media 07/12/2024    Anxiety     Contact dermatitis due to chemicals 01/12/2024    Elevated blood pressure reading 08/02/2023    Right-sided chest wall pain 06/12/2023        Past Surgical History:   Procedure Laterality Date    HYSTEROSCOPY WITH DILATION AND CURETTAGE OF UTERUS N/A 2022    Patient does not recall indication or pathology results       Family History   Problem Relation Name Age of Onset    Hypertension Mother      Hypertension Father      Diabetes Father      Breast cancer Maternal Cousin      Ovarian cancer Neg Hx      Colon cancer Neg Hx      Uterine cancer Neg Hx          Social History     Tobacco Use    Smoking status: Every Day     Current packs/day: 0.50     Average packs/day: 0.5 packs/day for 20.9 years (10.4 ttl pk-yrs)     Types: Cigarettes     Start date: 2004    Smokeless tobacco: Never   Substance and Sexual Activity    Alcohol use: Yes     Comment: occasionaly    Drug use: Never    Sexual activity: Yes     Partners: Male       Current Outpatient Medications   Medication Instructions    albuterol (PROVENTIL/VENTOLIN HFA) 90 mcg/actuation inhaler 1 puff, Every 6 hours PRN    ALPRAZolam (XANAX) 1 mg, Daily PRN    ascorbic acid/multivit-min (VITAMIN C FIZZY DRINK ORAL) 1 Bag, Daily    busPIRone (BUSPAR) 5 mg, 2 times daily    cholecalciferol (vitamin D3) 50,000 Units, Oral, Every 7 days    FLUoxetine 40 mg    fluticasone propionate (FLONASE) 50 mcg, Each Nostril, Daily    loratadine (CLARITIN) 10 mg, Oral, Daily    [START ON 11/19/2024] methylPREDNISolone (MEDROL DOSEPACK) 4 mg tablet use as directed    mupirocin (BACTROBAN) 2 % ointment Topical (Top), 3 times daily    triamcinolone acetonide 0.025% (KENALOG) 0.025 % cream 1 application , 2 times daily, Apply to affected area    valACYclovir  "(VALTREX) 1,000 mg, Oral, 2 times daily        Review of patient's allergies indicates:   Allergen Reactions    Ibuprofen Itching       Objective:     Visit Vitals  /83 (BP Location: Left arm, Patient Position: Sitting)   Pulse 73   Temp 98.1 °F (36.7 °C) (Oral)   Resp 18   Ht 4' 9" (1.448 m)   Wt 80.3 kg (177 lb)   LMP 11/08/2024   SpO2 98%   BMI 38.30 kg/m²       Physical Examination:     Physical Exam  Vitals reviewed.   Constitutional:       Appearance: Normal appearance. She is normal weight.   HENT:      Head: Normocephalic and atraumatic.      Right Ear: Hearing, tympanic membrane, ear canal and external ear normal.      Left Ear: Hearing, ear canal and external ear normal. A middle ear effusion is present.      Nose: Nose normal.      Right Turbinates: Enlarged and swollen.      Left Turbinates: Enlarged and swollen.      Mouth/Throat:      Lips: Pink.      Mouth: Mucous membranes are moist.      Pharynx: Postnasal drip present. No pharyngeal swelling, oropharyngeal exudate, posterior oropharyngeal erythema or uvula swelling.   Cardiovascular:      Rate and Rhythm: Normal rate and regular rhythm.      Pulses: Normal pulses.      Heart sounds: Normal heart sounds.   Pulmonary:      Effort: Pulmonary effort is normal.      Breath sounds: Normal breath sounds. No decreased breath sounds, wheezing, rhonchi or rales.   Abdominal:      General: Abdomen is flat.      Palpations: Abdomen is soft.   Musculoskeletal:         General: Normal range of motion.      Cervical back: Normal range of motion.   Skin:     General: Skin is warm and dry.   Neurological:      Mental Status: She is alert.   Psychiatric:         Mood and Affect: Mood normal.         Lab Results:     Chemistry:  Lab Results   Component Value Date     03/28/2024    K 3.4 (L) 03/28/2024    BUN 17.7 03/28/2024    CREATININE 0.89 03/28/2024    EGFRNORACEVR >60 03/28/2024    GLUCOSE 101 (H) 03/28/2024    CALCIUM 9.0 03/28/2024    ALKPHOS 65 " 03/28/2024    LABPROT 6.7 03/28/2024    ALBUMIN 3.5 03/28/2024    AST 13 03/28/2024    ALT 14 03/28/2024    [id number] 26.3 (L) 01/12/2024    TSH 0.709 01/12/2024    [id number] 0.87 01/12/2024        Lab Results   Component Value Date    HGBA1C 4.9 01/12/2024        Hematology:  Lab Results   Component Value Date    WBC 8.65 03/28/2024    HGB 13.6 03/28/2024    HCT 42.0 03/28/2024     03/28/2024       Lipid Panel:  Lab Results   Component Value Date    CHOL 176 01/12/2024    HDL 45 01/12/2024    .00 01/12/2024    TRIG 68 01/12/2024    TOTALCHOLEST 4 01/12/2024        Urine:  Lab Results   Component Value Date    APPEARANCEUA Turbid (A) 01/12/2024    SGUA 1.030 01/12/2024    PROTEINUA 1+ (A) 01/12/2024    KETONESUA Negative 01/12/2024    LEUKOCYTESUR 75 (A) 01/12/2024    RBCUA 0-5 01/12/2024    WBCUA 11-20 (A) 01/12/2024    BACTERIA Occ (A) 01/12/2024    SQEPUA Many (A) 01/12/2024    HYALINECASTS 0-2 (A) 01/12/2024        Assessment:          ICD-10-CM ICD-9-CM   1. Acute nasopharyngitis  J00 460   2. Skin lesions  L98.9 709.9        Plan:     1. Acute nasopharyngitis  -     COVID/FLU A&B PCR  -     loratadine (CLARITIN) 10 mg tablet; Take 1 tablet (10 mg total) by mouth once daily.  Dispense: 30 tablet; Refill: 3  -     fluticasone propionate (FLONASE) 50 mcg/actuation nasal spray; 1 spray (50 mcg total) by Each Nostril route once daily.  Dispense: 18.2 mL; Refill: 3  -     dexAMETHasone injection 4 mg  -     methylPREDNISolone (MEDROL DOSEPACK) 4 mg tablet; use as directed  Dispense: 21 each; Refill: 0    2. Skin lesions  Assessment & Plan:  Discussed skin care routine today.  Advised benzoyl peroxide washes daily with moisturizer.   If worsens, return to clinic for referral to dermatology.      Patient with lesions to bilateral lower extremities, back and arms. Thought were due to fleas. States her apartment gets evaluated for bed bugs frequently and it is not that. States vet told her that the  animals did not have fleas. Lesions appear to be insect bites with scabs noted and some hyperpigmentation, some are open wounds from scratching.    Mupirocin ointment prescribed today.     Orders:  -     mupirocin (BACTROBAN) 2 % ointment; Apply topically 3 (three) times daily.  Dispense: 22 g; Refill: 3         Follow up if symptoms worsen or fail to improve.    Future Appointments   Date Time Provider Department Center   1/13/2025  8:20 AM Barbra Jean Baptiste FNP [facility]   10/2/2025  8:30 AM Lay Lanza FNP [facility]        [facility] Farah

## 2025-01-13 ENCOUNTER — OFFICE VISIT (OUTPATIENT)
Dept: FAMILY MEDICINE | Facility: CLINIC | Age: 40
End: 2025-01-13
Payer: MEDICAID

## 2025-01-13 VITALS
WEIGHT: 190 LBS | OXYGEN SATURATION: 98 % | BODY MASS INDEX: 40.99 KG/M2 | SYSTOLIC BLOOD PRESSURE: 112 MMHG | RESPIRATION RATE: 18 BRPM | HEIGHT: 57 IN | DIASTOLIC BLOOD PRESSURE: 68 MMHG | HEART RATE: 68 BPM | TEMPERATURE: 98 F

## 2025-01-13 DIAGNOSIS — Z00.00 ENCOUNTER FOR WELLNESS EXAMINATION: ICD-10-CM

## 2025-01-13 DIAGNOSIS — L98.9 SKIN LESIONS: ICD-10-CM

## 2025-01-13 DIAGNOSIS — F32.A DEPRESSION, UNSPECIFIED DEPRESSION TYPE: ICD-10-CM

## 2025-01-13 DIAGNOSIS — F41.9 ANXIETY: ICD-10-CM

## 2025-01-13 DIAGNOSIS — A60.00 GENITAL HERPES SIMPLEX, UNSPECIFIED SITE: Primary | ICD-10-CM

## 2025-01-13 DIAGNOSIS — N92.0 MENORRHAGIA WITH REGULAR CYCLE: ICD-10-CM

## 2025-01-13 PROBLEM — U07.1 COVID: Status: RESOLVED | Noted: 2024-08-26 | Resolved: 2025-01-13

## 2025-01-13 LAB
25(OH)D3+25(OH)D2 SERPL-MCNC: 22 NG/ML (ref 30–80)
ALBUMIN SERPL-MCNC: 3.6 G/DL (ref 3.5–5)
ALBUMIN/GLOB SERPL: 1 RATIO (ref 1.1–2)
ALP SERPL-CCNC: 66 UNIT/L (ref 40–150)
ALT SERPL-CCNC: 11 UNIT/L (ref 0–55)
ANION GAP SERPL CALC-SCNC: 5 MEQ/L
AST SERPL-CCNC: 13 UNIT/L (ref 5–34)
BACTERIA #/AREA URNS AUTO: ABNORMAL /HPF
BASOPHILS # BLD AUTO: 0.02 X10(3)/MCL
BASOPHILS NFR BLD AUTO: 0.3 %
BILIRUB SERPL-MCNC: 0.3 MG/DL
BILIRUB UR QL STRIP.AUTO: NEGATIVE
BUN SERPL-MCNC: 12.9 MG/DL (ref 7–18.7)
CALCIUM SERPL-MCNC: 8.8 MG/DL (ref 8.4–10.2)
CHLORIDE SERPL-SCNC: 106 MMOL/L (ref 98–107)
CHOLEST SERPL-MCNC: 152 MG/DL
CHOLEST/HDLC SERPL: 4 {RATIO} (ref 0–5)
CLARITY UR: ABNORMAL
CO2 SERPL-SCNC: 27 MMOL/L (ref 22–29)
COLOR UR AUTO: ABNORMAL
CREAT SERPL-MCNC: 0.81 MG/DL (ref 0.55–1.02)
CREAT/UREA NIT SERPL: 16
EOSINOPHIL # BLD AUTO: 0.09 X10(3)/MCL (ref 0–0.9)
EOSINOPHIL NFR BLD AUTO: 1.4 %
ERYTHROCYTE [DISTWIDTH] IN BLOOD BY AUTOMATED COUNT: 13.6 % (ref 11.5–17)
EST. AVERAGE GLUCOSE BLD GHB EST-MCNC: 105.4 MG/DL
GFR SERPLBLD CREATININE-BSD FMLA CKD-EPI: >60 ML/MIN/1.73/M2
GLOBULIN SER-MCNC: 3.5 GM/DL (ref 2.4–3.5)
GLUCOSE SERPL-MCNC: 90 MG/DL (ref 74–100)
GLUCOSE UR QL STRIP: NEGATIVE
GROUP & RH: NORMAL
HAV IGM SERPL QL IA: NONREACTIVE
HBA1C MFR BLD: 5.3 %
HBV CORE IGM SERPL QL IA: NONREACTIVE
HBV SURFACE AG SERPL QL IA: NONREACTIVE
HCT VFR BLD AUTO: 44.1 % (ref 37–47)
HCV AB SERPL QL IA: NONREACTIVE
HDLC SERPL-MCNC: 39 MG/DL (ref 35–60)
HGB BLD-MCNC: 14 G/DL (ref 12–16)
HGB UR QL STRIP: ABNORMAL
HIV 1+2 AB+HIV1 P24 AG SERPL QL IA: NONREACTIVE
HYALINE CASTS #/AREA URNS LPF: ABNORMAL /LPF
IMM GRANULOCYTES # BLD AUTO: 0.01 X10(3)/MCL (ref 0–0.04)
IMM GRANULOCYTES NFR BLD AUTO: 0.2 %
INDIRECT COOMBS: NORMAL
KETONES UR QL STRIP: ABNORMAL
LDLC SERPL CALC-MCNC: 97 MG/DL (ref 50–140)
LEUKOCYTE ESTERASE UR QL STRIP: 250
LYMPHOCYTES # BLD AUTO: 3.34 X10(3)/MCL (ref 0.6–4.6)
LYMPHOCYTES NFR BLD AUTO: 52.8 %
MCH RBC QN AUTO: 27.8 PG (ref 27–31)
MCHC RBC AUTO-ENTMCNC: 31.7 G/DL (ref 33–36)
MCV RBC AUTO: 87.7 FL (ref 80–94)
MONOCYTES # BLD AUTO: 0.46 X10(3)/MCL (ref 0.1–1.3)
MONOCYTES NFR BLD AUTO: 7.3 %
NEUTROPHILS # BLD AUTO: 2.4 X10(3)/MCL (ref 2.1–9.2)
NEUTROPHILS NFR BLD AUTO: 38 %
NITRITE UR QL STRIP: NEGATIVE
NRBC BLD AUTO-RTO: 0 %
PH UR STRIP: 6.5 [PH]
PLATELET # BLD AUTO: 246 X10(3)/MCL (ref 130–400)
PMV BLD AUTO: 10.3 FL (ref 7.4–10.4)
POTASSIUM SERPL-SCNC: 4.6 MMOL/L (ref 3.5–5.1)
PROT SERPL-MCNC: 7.1 GM/DL (ref 6.4–8.3)
PROT UR QL STRIP: ABNORMAL
RBC # BLD AUTO: 5.03 X10(6)/MCL (ref 4.2–5.4)
RBC #/AREA URNS AUTO: >100 /HPF
SODIUM SERPL-SCNC: 138 MMOL/L (ref 136–145)
SP GR UR STRIP.AUTO: 1.02 (ref 1–1.03)
SPECIMEN OUTDATE: NORMAL
SQUAMOUS #/AREA URNS LPF: ABNORMAL /HPF
T PALLIDUM AB SER QL: NONREACTIVE
T4 FREE SERPL-MCNC: 1 NG/DL (ref 0.7–1.48)
TRIGL SERPL-MCNC: 80 MG/DL (ref 37–140)
TSH SERPL-ACNC: 0.59 UIU/ML (ref 0.35–4.94)
UROBILINOGEN UR STRIP-ACNC: NORMAL
VIT B12 SERPL-MCNC: 458 PG/ML (ref 213–816)
VLDLC SERPL CALC-MCNC: 16 MG/DL
WBC # BLD AUTO: 6.32 X10(3)/MCL (ref 4.5–11.5)
WBC #/AREA URNS AUTO: ABNORMAL /HPF

## 2025-01-13 PROCEDURE — 1160F RVW MEDS BY RX/DR IN RCRD: CPT | Mod: CPTII,,, | Performed by: NURSE PRACTITIONER

## 2025-01-13 PROCEDURE — 83036 HEMOGLOBIN GLYCOSYLATED A1C: CPT | Performed by: NURSE PRACTITIONER

## 2025-01-13 PROCEDURE — 86780 TREPONEMA PALLIDUM: CPT | Performed by: NURSE PRACTITIONER

## 2025-01-13 PROCEDURE — 3008F BODY MASS INDEX DOCD: CPT | Mod: CPTII,,, | Performed by: NURSE PRACTITIONER

## 2025-01-13 PROCEDURE — 36415 COLL VENOUS BLD VENIPUNCTURE: CPT | Mod: 91 | Performed by: NURSE PRACTITIONER

## 2025-01-13 PROCEDURE — 82607 VITAMIN B-12: CPT | Performed by: NURSE PRACTITIONER

## 2025-01-13 PROCEDURE — 82306 VITAMIN D 25 HYDROXY: CPT | Performed by: NURSE PRACTITIONER

## 2025-01-13 PROCEDURE — 80061 LIPID PANEL: CPT | Performed by: NURSE PRACTITIONER

## 2025-01-13 PROCEDURE — 99214 OFFICE O/P EST MOD 30 MIN: CPT | Mod: PBBFAC,PN | Performed by: NURSE PRACTITIONER

## 2025-01-13 PROCEDURE — 84443 ASSAY THYROID STIM HORMONE: CPT | Performed by: NURSE PRACTITIONER

## 2025-01-13 PROCEDURE — 99395 PREV VISIT EST AGE 18-39: CPT | Mod: S$PBB,,, | Performed by: NURSE PRACTITIONER

## 2025-01-13 PROCEDURE — 80074 ACUTE HEPATITIS PANEL: CPT | Performed by: NURSE PRACTITIONER

## 2025-01-13 PROCEDURE — 85025 COMPLETE CBC W/AUTO DIFF WBC: CPT | Performed by: NURSE PRACTITIONER

## 2025-01-13 PROCEDURE — 84439 ASSAY OF FREE THYROXINE: CPT | Performed by: NURSE PRACTITIONER

## 2025-01-13 PROCEDURE — 3078F DIAST BP <80 MM HG: CPT | Mod: CPTII,,, | Performed by: NURSE PRACTITIONER

## 2025-01-13 PROCEDURE — 86900 BLOOD TYPING SEROLOGIC ABO: CPT | Performed by: NURSE PRACTITIONER

## 2025-01-13 PROCEDURE — 87389 HIV-1 AG W/HIV-1&-2 AB AG IA: CPT | Performed by: NURSE PRACTITIONER

## 2025-01-13 PROCEDURE — 3074F SYST BP LT 130 MM HG: CPT | Mod: CPTII,,, | Performed by: NURSE PRACTITIONER

## 2025-01-13 PROCEDURE — 1159F MED LIST DOCD IN RCRD: CPT | Mod: CPTII,,, | Performed by: NURSE PRACTITIONER

## 2025-01-13 PROCEDURE — 99214 OFFICE O/P EST MOD 30 MIN: CPT | Mod: 25,S$PBB,, | Performed by: NURSE PRACTITIONER

## 2025-01-13 PROCEDURE — 80053 COMPREHEN METABOLIC PANEL: CPT | Performed by: NURSE PRACTITIONER

## 2025-01-13 PROCEDURE — 81001 URINALYSIS AUTO W/SCOPE: CPT | Performed by: NURSE PRACTITIONER

## 2025-01-13 RX ORDER — VALACYCLOVIR HYDROCHLORIDE 1 G/1
1000 TABLET, FILM COATED ORAL 2 TIMES DAILY
Qty: 20 TABLET | Refills: 11 | Status: SHIPPED | OUTPATIENT
Start: 2025-01-13

## 2025-01-13 NOTE — PROGRESS NOTES
Patient Name: Fátima Brown     : 1985    MRN: [id number]     Subjective:     Patient ID: Fátima Brown is a 39 y.o. female.    Chief Complaint:   Chief Complaint   Patient presents with    Follow-up     Pt is here for follow up. Pt needs valtrex refilled        HPI: 25:  Annual wellness and 6 month FU visit anxiety/depression, tobacco use. Doing well today with no complaints.  Would like to know her blood type for general   Denies ankle pain, ear pain, and skin lesions resolved.  Needs a refill of her Valtrex, hx genital herpes.       24: Pt here with reports of cold with nightly cough since 11/15/2024. Pt encouraged to report to urgent care but refused.  Denies fever. Mainly coughing only at night and improves during the day.  Started coughing up green productive sputum but this morning was clear. Today would be Day 4 of symptoms. She does not sound to have nasal congestion at this time. She does complain of itching to left ear and watery drainage at times.   Also has diffuse scattered skin lesions to chin, upper back, legs and arms that appear to be some type of insect bite and have scabs. States they start out as red bumps and then they turn into sores. Has ruled out bed bugs. Not thinking it is mosquito bites either.         24:  Pt is here for follow up. Pt c/o fluid to ears x two months. Pt also c/o bilateral ankle pain every AM with multiple skin lesions.      Bilateral ankle pain-  Pt states that when she wakes and dresses for work both of her ankles hurt. She has never injured them.  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  Her shoes are not very supportive and she does not wear compression socks.      Bilateral ear pain-  For a couple of days the left ear has been hurting worse than the right.  She denies any history of allergies.      Lesions to skin of face-  Recently started.  He plucks hairs from her chin and  this causes open lesions. No skin care routine reported.        Patient has GYN appointment upcoming. Due for PAP. Having problems with heavy menses and occasionally has pain to left lower abdomen. Hx ovarian cysts in past.               ROS:      Review of Systems   Constitutional: Negative.    HENT: Negative.     Eyes: Negative.    Respiratory: Negative.     Cardiovascular: Negative.    Gastrointestinal: Negative.    Genitourinary: Negative.    Musculoskeletal: Negative.    Skin: Negative.    Neurological: Negative.    Endo/Heme/Allergies: Negative.    Psychiatric/Behavioral: Negative.     All other systems reviewed and are negative.           History:     Past Medical History:   Diagnosis Date    Acute otitis media 07/12/2024    Anxiety     Contact dermatitis due to chemicals 01/12/2024    COVID 08/26/2024    Elevated blood pressure reading 08/02/2023    Right-sided chest wall pain 06/12/2023        Past Surgical History:   Procedure Laterality Date    HYSTEROSCOPY WITH DILATION AND CURETTAGE OF UTERUS N/A 2022    Patient does not recall indication or pathology results       Family History   Problem Relation Name Age of Onset    Hypertension Mother      Hypertension Father      Diabetes Father      Breast cancer Maternal Cousin      Ovarian cancer Neg Hx      Colon cancer Neg Hx      Uterine cancer Neg Hx          Social History     Tobacco Use    Smoking status: Every Day     Current packs/day: 0.50     Average packs/day: 0.5 packs/day for 21.0 years (10.5 ttl pk-yrs)     Types: Cigarettes     Start date: 2004    Smokeless tobacco: Never   Substance and Sexual Activity    Alcohol use: Yes     Comment: occasionaly    Drug use: Never    Sexual activity: Yes     Partners: Male       Current Outpatient Medications   Medication Instructions    albuterol (PROVENTIL/VENTOLIN HFA) 90 mcg/actuation inhaler 1 puff, Every 6 hours PRN    ALPRAZolam (XANAX) 1 mg, Daily PRN    ascorbic acid/multivit-min (VITAMIN C FIZZY DRINK  "ORAL) 1 Bag, Daily    busPIRone (BUSPAR) 5 mg, 2 times daily    cholecalciferol (vitamin D3) 50,000 Units, Oral, Every 7 days    FLUoxetine 40 mg    fluticasone propionate (FLONASE) 50 mcg, Each Nostril, Daily    loratadine (CLARITIN) 10 mg, Oral, Daily    mupirocin (BACTROBAN) 2 % ointment Topical (Top), 3 times daily    triamcinolone acetonide 0.025% (KENALOG) 0.025 % cream 1 application , 2 times daily, Apply to affected area    valACYclovir (VALTREX) 1,000 mg, Oral, 2 times daily        Review of patient's allergies indicates:   Allergen Reactions    Ibuprofen Itching       Objective:     Visit Vitals  /68 (BP Location: Left arm, Patient Position: Sitting)   Pulse 68   Temp 98.2 °F (36.8 °C) (Oral)   Resp 18   Ht 4' 9" (1.448 m)   Wt 86.2 kg (190 lb)   LMP 01/12/2025   SpO2 98%   BMI 41.12 kg/m²       Physical Examination:     Physical Exam  Vitals reviewed.   Constitutional:       Appearance: Normal appearance. She is normal weight.   HENT:      Head: Normocephalic.   Cardiovascular:      Rate and Rhythm: Normal rate and regular rhythm.      Pulses: Normal pulses.      Heart sounds: Normal heart sounds.   Pulmonary:      Effort: Pulmonary effort is normal.      Breath sounds: Normal breath sounds.   Abdominal:      General: Abdomen is flat.      Palpations: Abdomen is soft.   Musculoskeletal:         General: Normal range of motion.      Cervical back: Normal range of motion.   Skin:     General: Skin is warm and dry.   Neurological:      Mental Status: She is alert.   Psychiatric:         Mood and Affect: Mood normal.         Lab Results:     Chemistry:  Lab Results   Component Value Date     01/13/2025    K 4.6 01/13/2025    BUN 12.9 01/13/2025    CREATININE 0.81 01/13/2025    EGFRNORACEVR >60 01/13/2025    GLUCOSE 90 01/13/2025    CALCIUM 8.8 01/13/2025    ALKPHOS 66 01/13/2025    LABPROT 7.1 01/13/2025    ALBUMIN 3.6 01/13/2025    AST 13 01/13/2025    ALT 11 01/13/2025    [id number] 22 (L) " 01/13/2025    TSH 0.586 01/13/2025    [id number] 1.00 01/13/2025        Lab Results   Component Value Date    HGBA1C 5.3 01/13/2025        Hematology:  Lab Results   Component Value Date    WBC 6.32 01/13/2025    HGB 14.0 01/13/2025    HCT 44.1 01/13/2025     01/13/2025       Lipid Panel:  Lab Results   Component Value Date    CHOL 152 01/13/2025    HDL 39 01/13/2025    LDL 97.00 01/13/2025    TRIG 80 01/13/2025    TOTALCHOLEST 4 01/13/2025        Urine:  Lab Results   Component Value Date    APPEARANCEUA Turbid (A) 01/13/2025    SGUA 1.020 01/13/2025    PROTEINUA 1+ (A) 01/13/2025    KETONESUA Trace (A) 01/13/2025    LEUKOCYTESUR 250 (A) 01/13/2025    RBCUA >100 (A) 01/13/2025    WBCUA 6-10 (A) 01/13/2025    BACTERIA Rare 01/13/2025    SQEPUA Moderate 01/13/2025    HYALINECASTS None Seen 01/13/2025        Assessment:          ICD-10-CM ICD-9-CM   1. Genital herpes simplex, unspecified site  A60.00 054.10   2. Encounter for wellness examination  Z00.00 V70.0   3. Depression, unspecified depression type  F32.A 311   4. Anxiety  F41.9 300.00   5. Skin lesions  L98.9 709.9   6. Menorrhagia with regular cycle  N92.0 626.2        Plan:     1. Genital herpes simplex, unspecified site  Assessment & Plan:  Chronic stable problem. Continue Valtrex-refilled today  Pt states she has never had a lesion to her vagina.  She is not even really sure if she has genital herpes.  She is on suppressive therapy prior to seeing me.     Orders:  -     valACYclovir (VALTREX) 1000 MG tablet; Take 1 tablet (1,000 mg total) by mouth 2 (two) times daily.  Dispense: 20 tablet; Refill: 11    2. Encounter for wellness examination  -     CBC Auto Differential  -     Comprehensive Metabolic Panel  -     Urinalysis  -     Hemoglobin A1C  -     TSH  -     T4, Free  -     Vitamin D  -     Vitamin B12  -     Hepatitis Panel, Acute  -     HIV 1/2 Ag/Ab (4th Gen)  -     SYPHILIS ANTIBODY (WITH REFLEX RPR)  -     Lipid Panel  -     Type &  Screen    3. Depression, unspecified depression type  Assessment & Plan:  Followed by psych        4. Anxiety  Assessment & Plan:  Followed by psych      5. Skin lesions  Assessment & Plan:  No issues reported today      6. Menorrhagia with regular cycle  Assessment & Plan:  Patient with resolved AUB reported as heavier than normal menses with dysmenorrhea x2 months.   No further complaints or concerns.           Follow up in about 6 months (around 7/13/2025) for Anxiety, depression, skin lesions..  In addition to their scheduled follow up, the patient has also been instructed to follow up on as needed basis.         Future Appointments   Date Time Provider Department Center   7/14/2025  9:40 AM Barbra Jean Baptiste FNP [facility]   10/2/2025  8:30 AM Lay Lanza FNP [facility]        [facility]

## 2025-01-13 NOTE — ASSESSMENT & PLAN NOTE
Chronic stable problem. Continue Valtrex-refilled today  Pt states she has never had a lesion to her vagina.  She is not even really sure if she has genital herpes.  She is on suppressive therapy prior to seeing me.

## 2025-01-15 ENCOUNTER — PATIENT MESSAGE (OUTPATIENT)
Dept: FAMILY MEDICINE | Facility: CLINIC | Age: 40
End: 2025-01-15
Payer: COMMERCIAL

## 2025-01-15 DIAGNOSIS — E55.9 VITAMIN D DEFICIENCY: ICD-10-CM

## 2025-01-15 RX ORDER — ASPIRIN 325 MG
50000 TABLET, DELAYED RELEASE (ENTERIC COATED) ORAL
Qty: 12 CAPSULE | Refills: 0 | Status: SHIPPED | OUTPATIENT
Start: 2025-01-15

## 2025-01-16 NOTE — TELEPHONE ENCOUNTER
I have sent medications and/or lab orders in for this patient.  Please notify the patient.     No orders of the defined types were placed in this encounter.      Medications Ordered This Encounter   Medications    cholecalciferol, vitamin D3, 1,250 mcg (50,000 unit) capsule     Sig: Take 1 capsule (50,000 Units total) by mouth every 7 days.     Dispense:  12 capsule     Refill:  0

## 2025-03-07 DIAGNOSIS — J00 ACUTE NASOPHARYNGITIS: ICD-10-CM

## 2025-03-07 RX ORDER — LORATADINE 10 MG/1
10 TABLET ORAL
Qty: 30 TABLET | Refills: 3 | Status: SHIPPED | OUTPATIENT
Start: 2025-03-07

## 2025-05-16 DIAGNOSIS — E55.9 VITAMIN D DEFICIENCY: ICD-10-CM

## 2025-05-19 RX ORDER — ASPIRIN 325 MG
TABLET, DELAYED RELEASE (ENTERIC COATED) ORAL
Qty: 12 CAPSULE | Refills: 0 | Status: SHIPPED | OUTPATIENT
Start: 2025-05-19

## 2025-07-10 ENCOUNTER — TELEPHONE (OUTPATIENT)
Dept: FAMILY MEDICINE | Facility: CLINIC | Age: 40
End: 2025-07-10
Payer: MEDICAID

## 2025-07-10 NOTE — TELEPHONE ENCOUNTER
?? **Pre-Visit Call Screening**       ?? Reason for Visit  Chief complaint:  Anxiety, depression      ?? Medication Review    - Patient reminded to bring medications to visit: Yes      ?? Lab Work   - Pre-visit labs ordered: No      ?? Recent Health Events  - Any ER, urgent care, or hospital visits since last appointment: No    - If yes, date & location:       ?? Access and Support Needs  - Transportation issues: No  -  needed:  No

## 2025-07-14 ENCOUNTER — OFFICE VISIT (OUTPATIENT)
Dept: FAMILY MEDICINE | Facility: CLINIC | Age: 40
End: 2025-07-14
Payer: MEDICAID

## 2025-07-14 VITALS
BODY MASS INDEX: 41.21 KG/M2 | HEIGHT: 57 IN | WEIGHT: 191 LBS | RESPIRATION RATE: 18 BRPM | OXYGEN SATURATION: 98 % | HEART RATE: 83 BPM | TEMPERATURE: 10 F | DIASTOLIC BLOOD PRESSURE: 69 MMHG | SYSTOLIC BLOOD PRESSURE: 106 MMHG

## 2025-07-14 DIAGNOSIS — A60.00 GENITAL HERPES SIMPLEX, UNSPECIFIED SITE: ICD-10-CM

## 2025-07-14 DIAGNOSIS — F41.9 ANXIETY: ICD-10-CM

## 2025-07-14 DIAGNOSIS — N92.0 MENORRHAGIA WITH REGULAR CYCLE: ICD-10-CM

## 2025-07-14 DIAGNOSIS — G89.29 CHRONIC BILATERAL LOW BACK PAIN WITHOUT SCIATICA: Primary | ICD-10-CM

## 2025-07-14 DIAGNOSIS — J30.2 SEASONAL ALLERGIES: ICD-10-CM

## 2025-07-14 DIAGNOSIS — F32.A DEPRESSION, UNSPECIFIED DEPRESSION TYPE: ICD-10-CM

## 2025-07-14 DIAGNOSIS — M54.50 CHRONIC BILATERAL LOW BACK PAIN WITHOUT SCIATICA: Primary | ICD-10-CM

## 2025-07-14 PROBLEM — L98.9 SKIN LESIONS: Status: RESOLVED | Noted: 2024-07-12 | Resolved: 2025-07-14

## 2025-07-14 PROCEDURE — 99214 OFFICE O/P EST MOD 30 MIN: CPT | Mod: S$PBB,,, | Performed by: NURSE PRACTITIONER

## 2025-07-14 PROCEDURE — 3074F SYST BP LT 130 MM HG: CPT | Mod: CPTII,,, | Performed by: NURSE PRACTITIONER

## 2025-07-14 PROCEDURE — 3044F HG A1C LEVEL LT 7.0%: CPT | Mod: CPTII,,, | Performed by: NURSE PRACTITIONER

## 2025-07-14 PROCEDURE — 99215 OFFICE O/P EST HI 40 MIN: CPT | Mod: PBBFAC,PN | Performed by: NURSE PRACTITIONER

## 2025-07-14 PROCEDURE — 3008F BODY MASS INDEX DOCD: CPT | Mod: CPTII,,, | Performed by: NURSE PRACTITIONER

## 2025-07-14 PROCEDURE — 1160F RVW MEDS BY RX/DR IN RCRD: CPT | Mod: CPTII,,, | Performed by: NURSE PRACTITIONER

## 2025-07-14 PROCEDURE — 3078F DIAST BP <80 MM HG: CPT | Mod: CPTII,,, | Performed by: NURSE PRACTITIONER

## 2025-07-14 PROCEDURE — 1159F MED LIST DOCD IN RCRD: CPT | Mod: CPTII,,, | Performed by: NURSE PRACTITIONER

## 2025-07-14 RX ORDER — FLUTICASONE PROPIONATE 50 MCG
1 SPRAY, SUSPENSION (ML) NASAL DAILY
Qty: 18.2 ML | Refills: 11 | Status: SHIPPED | OUTPATIENT
Start: 2025-07-14

## 2025-07-14 RX ORDER — METHOCARBAMOL 750 MG/1
750 TABLET, FILM COATED ORAL 3 TIMES DAILY
Qty: 90 TABLET | Refills: 6 | Status: SHIPPED | OUTPATIENT
Start: 2025-07-14

## 2025-07-14 RX ORDER — LORATADINE 10 MG/1
10 TABLET ORAL DAILY
Qty: 30 TABLET | Refills: 3 | Status: SHIPPED | OUTPATIENT
Start: 2025-07-14

## 2025-07-14 NOTE — ASSESSMENT & PLAN NOTE
Referral to PT  Pt can't take NSAIDs, she has allergy to Ibuprofen in chart.  Pt needs to take Tylenol TID  Robaxin 750 mg po TID prn spasm  Offered Gabapentin, pt declined.   Return to clinic 2 months after PT done

## 2025-07-14 NOTE — ASSESSMENT & PLAN NOTE
Chronic stable problem. Continue Valtrex    Pt states she has never had a lesion to her vagina.  She is not even really sure if she has genital herpes.  She is on suppressive therapy prior to seeing me.
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
## 2025-07-14 NOTE — PATIENT INSTRUCTIONS
Johnny Shine,     If you are due for any health screening(s) below please notify me so we can arrange them to be ordered and scheduled. Most healthy patients at your age complete them, but you are free to accept or refuse.     If you can't do it, I'll definitely understand. If you can, I'd certainly appreciate it!    Tests to Keep You Healthy    Cervical Cancer Screening: Met on 9/30/2024  Tobacco Cessation: NO      Were here to help you quit smoking     Our records indicated that you are still smoking. One of the best things you can do for your health is to stop smoking and we are here to help.     Talk with your provider about our Smoking Cessation Program and how we can support you on your journey.